# Patient Record
Sex: FEMALE | Race: WHITE | NOT HISPANIC OR LATINO | Employment: OTHER | URBAN - METROPOLITAN AREA
[De-identification: names, ages, dates, MRNs, and addresses within clinical notes are randomized per-mention and may not be internally consistent; named-entity substitution may affect disease eponyms.]

---

## 2021-03-24 ENCOUNTER — BULK ORDERING (OUTPATIENT)
Dept: CASE MANAGEMENT | Facility: OTHER | Age: 51
End: 2021-03-24

## 2021-03-24 DIAGNOSIS — Z23 IMMUNIZATION DUE: ICD-10-CM

## 2021-04-02 ENCOUNTER — APPOINTMENT (OUTPATIENT)
Dept: WOMENS IMAGING | Facility: HOSPITAL | Age: 51
End: 2021-04-02

## 2021-04-02 ENCOUNTER — PROCEDURE VISIT (OUTPATIENT)
Dept: OBSTETRICS AND GYNECOLOGY | Age: 51
End: 2021-04-02

## 2021-04-02 ENCOUNTER — OFFICE VISIT (OUTPATIENT)
Dept: OBSTETRICS AND GYNECOLOGY | Age: 51
End: 2021-04-02

## 2021-04-02 VITALS
DIASTOLIC BLOOD PRESSURE: 68 MMHG | WEIGHT: 157 LBS | SYSTOLIC BLOOD PRESSURE: 102 MMHG | BODY MASS INDEX: 29.64 KG/M2 | HEIGHT: 61 IN

## 2021-04-02 DIAGNOSIS — Z12.4 SCREENING FOR MALIGNANT NEOPLASM OF THE CERVIX: ICD-10-CM

## 2021-04-02 DIAGNOSIS — Z13.89 SCREENING FOR BLOOD OR PROTEIN IN URINE: ICD-10-CM

## 2021-04-02 DIAGNOSIS — Z01.419 ENCOUNTER FOR GYNECOLOGICAL EXAMINATION WITHOUT ABNORMAL FINDING: Primary | ICD-10-CM

## 2021-04-02 DIAGNOSIS — Z12.31 VISIT FOR SCREENING MAMMOGRAM: Primary | ICD-10-CM

## 2021-04-02 DIAGNOSIS — N95.1 PERIMENOPAUSE: ICD-10-CM

## 2021-04-02 LAB
BILIRUB BLD-MCNC: NEGATIVE MG/DL
GLUCOSE UR STRIP-MCNC: NEGATIVE MG/DL
KETONES UR QL: NEGATIVE
LEUKOCYTE EST, POC: NEGATIVE
NITRITE UR-MCNC: NEGATIVE MG/ML
PH UR: 6.5 [PH] (ref 5–8)
PROT UR STRIP-MCNC: NEGATIVE MG/DL
RBC # UR STRIP: NEGATIVE /UL
SP GR UR: 1.01 (ref 1–1.03)
UROBILINOGEN UR QL: NORMAL

## 2021-04-02 PROCEDURE — 77067 SCR MAMMO BI INCL CAD: CPT | Performed by: RADIOLOGY

## 2021-04-02 PROCEDURE — 99386 PREV VISIT NEW AGE 40-64: CPT | Performed by: OBSTETRICS & GYNECOLOGY

## 2021-04-02 PROCEDURE — 77067 SCR MAMMO BI INCL CAD: CPT | Performed by: OBSTETRICS & GYNECOLOGY

## 2021-04-02 PROCEDURE — 81002 URINALYSIS NONAUTO W/O SCOPE: CPT | Performed by: OBSTETRICS & GYNECOLOGY

## 2021-04-02 RX ORDER — METHYLPHENIDATE HYDROCHLORIDE 10 MG/1
10 TABLET ORAL 3 TIMES DAILY
COMMUNITY
Start: 2021-02-15 | End: 2021-04-02

## 2021-04-02 NOTE — PROGRESS NOTES
"Subjective   Trish Corrales is a 51 y.o. female new pt moved from [place] , went to college here has family cc: annual visit today , last pap smear 2018 neg no hx of abnormal paps , mmg 2019 according to pt  , periods are normal , not using anything for contraception , had D&C for heavy bleeding in 2019, yr ago started seeing hormone specialist for weight gain and hot flashes  , did pallets testosterone , stopped them due side effects . Still having regular periods. Currently living in parent's basement and looking for a home. Had colonoscopy before leaving [place] and reports normal.      History of Present Illness    The following portions of the patient's history were reviewed and updated as appropriate: allergies, current medications, past family history, past medical history, past social history, past surgical history and problem list.    Review of Systems   Constitutional: Negative for chills, fatigue and fever.   Gastrointestinal: Negative for abdominal distention and abdominal pain.   Genitourinary: Negative for dyspareunia, dysuria, menstrual problem, pelvic pain, vaginal bleeding, vaginal discharge and vaginal pain.   All other systems reviewed and are negative.  /68   Ht 154.9 cm (61\")   Wt 71.2 kg (157 lb)   LMP 03/30/2021 (Exact Date)   Breastfeeding No   BMI 29.66 kg/m²       Objective   Physical Exam  Vitals and nursing note reviewed.   Constitutional:       Appearance: Normal appearance. She is well-developed and normal weight.   Neck:      Thyroid: No thyromegaly.   Cardiovascular:      Rate and Rhythm: Normal rate and regular rhythm.   Pulmonary:      Effort: Pulmonary effort is normal.      Breath sounds: Normal breath sounds.   Chest:      Breasts:         Right: No mass, nipple discharge, skin change or tenderness.         Left: No mass, nipple discharge, skin change or tenderness.   Abdominal:      General: Bowel sounds are normal. There is no distension.      Palpations: Abdomen is soft.      " Tenderness: There is no abdominal tenderness.   Genitourinary:     General: Normal vulva.      Exam position: Supine.      Labia:         Right: No rash or lesion.         Left: No rash or lesion.       Vagina: Normal. No vaginal discharge.      Cervix: No friability, lesion or cervical bleeding.      Uterus: Not enlarged and not tender.       Adnexa:         Right: No mass or tenderness.          Left: No mass or tenderness.     Musculoskeletal:         General: Normal range of motion.      Cervical back: Normal range of motion and neck supple.   Skin:     General: Skin is warm and dry.      Findings: No rash.   Neurological:      Mental Status: She is alert and oriented to person, place, and time.   Psychiatric:         Mood and Affect: Mood normal.         Behavior: Behavior normal.           Assessment/Plan   Diagnoses and all orders for this visit:    1. Encounter for gynecological examination without abnormal finding (Primary)    2. Screening for malignant neoplasm of the cervix  -     IgP, Aptima HPV    3. Perimenopause    4. Screening for blood or protein in urine  -     POC Urinalysis Dipstick    MGM here today   Counseling was given to patient for the following topics: instructions for management, impressions, importance of treatment compliance and self-breast exams .   Return in about 1 year (around 4/2/2022) for Annual physical with MGm .

## 2021-04-06 LAB
CYTOLOGIST CVX/VAG CYTO: NORMAL
CYTOLOGY CVX/VAG DOC CYTO: NORMAL
CYTOLOGY CVX/VAG DOC THIN PREP: NORMAL
DX ICD CODE: NORMAL
HIV 1 & 2 AB SER-IMP: NORMAL
HPV I/H RISK 4 DNA CVX QL PROBE+SIG AMP: NEGATIVE
OTHER STN SPEC: NORMAL
STAT OF ADQ CVX/VAG CYTO-IMP: NORMAL

## 2021-04-07 DIAGNOSIS — R92.8 ABNORMALITY OF LEFT BREAST ON SCREENING MAMMOGRAM: Primary | ICD-10-CM

## 2021-04-07 PROCEDURE — 76641 ULTRASOUND BREAST COMPLETE: CPT | Performed by: OBSTETRICS & GYNECOLOGY

## 2021-05-04 ENCOUNTER — APPOINTMENT (OUTPATIENT)
Dept: WOMENS IMAGING | Facility: HOSPITAL | Age: 51
End: 2021-05-04

## 2021-05-04 PROCEDURE — 77065 DX MAMMO INCL CAD UNI: CPT | Performed by: RADIOLOGY

## 2021-05-04 PROCEDURE — 77061 BREAST TOMOSYNTHESIS UNI: CPT | Performed by: RADIOLOGY

## 2021-05-04 PROCEDURE — 76641 ULTRASOUND BREAST COMPLETE: CPT | Performed by: RADIOLOGY

## 2021-05-04 PROCEDURE — G0279 TOMOSYNTHESIS, MAMMO: HCPCS | Performed by: RADIOLOGY

## 2021-07-23 ENCOUNTER — TELEPHONE (OUTPATIENT)
Dept: OBSTETRICS AND GYNECOLOGY | Age: 51
End: 2021-07-23

## 2021-08-26 ENCOUNTER — OFFICE VISIT (OUTPATIENT)
Dept: FAMILY MEDICINE CLINIC | Facility: CLINIC | Age: 51
End: 2021-08-26

## 2021-08-26 VITALS
OXYGEN SATURATION: 96 % | BODY MASS INDEX: 27.75 KG/M2 | HEART RATE: 65 BPM | DIASTOLIC BLOOD PRESSURE: 76 MMHG | HEIGHT: 61 IN | WEIGHT: 147 LBS | SYSTOLIC BLOOD PRESSURE: 116 MMHG | TEMPERATURE: 97.3 F

## 2021-08-26 DIAGNOSIS — Z79.899 HIGH RISK MEDICATION USE: ICD-10-CM

## 2021-08-26 DIAGNOSIS — R53.82 CHRONIC FATIGUE: ICD-10-CM

## 2021-08-26 DIAGNOSIS — Z00.00 HEALTHCARE MAINTENANCE: ICD-10-CM

## 2021-08-26 DIAGNOSIS — F90.0 ATTENTION DEFICIT HYPERACTIVITY DISORDER (ADHD), PREDOMINANTLY INATTENTIVE TYPE: ICD-10-CM

## 2021-08-26 DIAGNOSIS — Z23 IMMUNIZATION DUE: Primary | ICD-10-CM

## 2021-08-26 DIAGNOSIS — E55.9 VITAMIN D DEFICIENCY: ICD-10-CM

## 2021-08-26 DIAGNOSIS — N95.1 PERIMENOPAUSE: ICD-10-CM

## 2021-08-26 DIAGNOSIS — D50.9 IRON DEFICIENCY ANEMIA, UNSPECIFIED IRON DEFICIENCY ANEMIA TYPE: ICD-10-CM

## 2021-08-26 PROCEDURE — 90471 IMMUNIZATION ADMIN: CPT | Performed by: FAMILY MEDICINE

## 2021-08-26 PROCEDURE — 90750 HZV VACC RECOMBINANT IM: CPT | Performed by: FAMILY MEDICINE

## 2021-08-26 PROCEDURE — 99203 OFFICE O/P NEW LOW 30 MIN: CPT | Performed by: FAMILY MEDICINE

## 2021-08-26 RX ORDER — FERROUS SULFATE 325(65) MG
TABLET ORAL DAILY
COMMUNITY

## 2021-08-26 RX ORDER — METHYLPHENIDATE HYDROCHLORIDE 10 MG/1
10 TABLET ORAL DAILY
COMMUNITY
End: 2021-11-11 | Stop reason: SDUPTHER

## 2021-08-26 RX ORDER — MULTIPLE VITAMINS W/ MINERALS TAB 9MG-400MCG
1 TAB ORAL DAILY
COMMUNITY

## 2021-08-26 NOTE — PROGRESS NOTES
Subjective   Trish Corrales is a 51 y.o. female.     Chief Complaint   Patient presents with   • new patient     establish care       History of Present Illness     Patient is a new patient here today.  The most significant past medical history includes the ADD diagnosed in adulthood.  She has been on Ritalin by her previous psychiatrist.  We will need to get all the records regarding her Ritalin for before her next refill in 3 months  The following portions of the patient's history were reviewed and updated as appropriate: allergies, current medications, past family history, past medical history, past social history, past surgical history and problem list.    Past Medical History:   Diagnosis Date   • ADHD (attention deficit hyperactivity disorder)    • Depression        Past Surgical History:   Procedure Laterality Date   • DILATATION AND CURETTAGE      aub    • EYE SURGERY  1975/1978   • TONSILLECTOMY      1987   • WISDOM TOOTH EXTRACTION         Family History   Problem Relation Age of Onset   • Endometrial cancer Mother    • Hyperlipidemia Father    • Heart disease Father    • Breast cancer Neg Hx    • Ovarian cancer Neg Hx    • Uterine cancer Neg Hx    • Colon cancer Neg Hx    • Melanoma Neg Hx        Social History     Socioeconomic History   • Marital status: Single     Spouse name: Not on file   • Number of children: Not on file   • Years of education: Not on file   • Highest education level: Not on file   Tobacco Use   • Smoking status: Never Smoker   • Smokeless tobacco: Never Used   Substance and Sexual Activity   • Alcohol use: Never   • Drug use: Never   • Sexual activity: Not Currently     Partners: Male     Birth control/protection: None       Current Outpatient Medications on File Prior to Visit   Medication Sig Dispense Refill   • BIOTIN PO Take 5,000 mcg by mouth. Take once daily.     • CHELATED IRON PO Take 27 mg by mouth Daily. Take once daily     • Docosahexaenoic Acid (Algal Omega-3 DHA) 200 MG  "capsule Take  by mouth Daily. Take once daily.     • Lactobacillus Rhamnosus, GG, (PROBIOTIC COLIC PO) Take  by mouth. Take 2 in the morning.     • methylphenidate (RITALIN) 10 MG tablet Take 10 mg by mouth Daily. Take 1 tablet once daily.     • multivitamin with minerals (WOMENS ONE DAILY PO) Take 1 tablet by mouth Daily.     • vitamin D3 125 MCG (5000 UT) capsule capsule Take 5,000 Units by mouth Daily.       No current facility-administered medications on file prior to visit.       Review of Systems   Constitutional: Negative.        Recent Results (from the past 4704 hour(s))   POC Urinalysis Dipstick    Collection Time: 04/02/21 10:50 AM    Specimen: Urine   Result Value Ref Range    Glucose, UA Negative Negative, 1000 mg/dL (3+) mg/dL    Bilirubin Negative Negative    Ketones, UA Negative Negative    Specific Gravity  1.010 1.005 - 1.030    Blood, UA Negative Negative    pH, Urine 6.5 5.0 - 8.0    Protein, POC Negative Negative mg/dL    Urobilinogen, UA Normal Normal    Leukocytes Negative Negative    Nitrite, UA Negative Negative   IgP, Aptima HPV    Collection Time: 04/02/21 11:44 AM    Specimen: Cervix; ThinPrep Vial   Result Value Ref Range    Diagnosis Comment     Specimen adequacy: Comment     Clinician Provided ICD-10: Comment     Performed by: Comment     . .     Note: Comment     Method: Comment     HPV Aptima Negative Negative     Objective   Vitals:    08/26/21 1427   BP: 116/76   Pulse: 65   Temp: 97.3 °F (36.3 °C)   SpO2: 96%   Weight: 66.7 kg (147 lb)   Height: 154.9 cm (60.98\")     Body mass index is 27.79 kg/m².  Physical Exam  Vitals and nursing note reviewed.   Constitutional:       General: She is not in acute distress.     Appearance: She is well-developed. She is not diaphoretic.   Cardiovascular:      Rate and Rhythm: Normal rate and regular rhythm.   Pulmonary:      Effort: Pulmonary effort is normal. No respiratory distress.      Breath sounds: Normal breath sounds. No wheezing. "           Diagnoses and all orders for this visit:    1. Immunization due (Primary)  -     Shingrix Vaccine    2. Chronic fatigue  -     CBC & Differential  -     Vitamin B12 & Folate  -     TSH Rfx On Abnormal To Free T4  -     T3, free  -     Ferritin    3. Vitamin D deficiency  -     Vitamin D 25 Hydroxy    4. Healthcare maintenance  -     Comprehensive Metabolic Panel  -     Lipid Panel    5. Iron deficiency anemia, unspecified iron deficiency anemia type  -     Ferritin    6. Perimenopause  -     Estradiol  -     Progesterone  -     Sex Horm Binding Globulin  -     Testosterone (Free & Total), LC / MS  -     DHEA-Sulfate    7. Attention deficit hyperactivity disorder (ADHD), predominantly inattentive type    8. High risk medication use      Patient is here also for blood work and requesting whole panel of hormone blood test for her specialist who does bioidentical hormone replacement therapy.  Patient is to return for blood work appointment fasting

## 2021-08-27 DIAGNOSIS — R05.9 COUGH: Primary | ICD-10-CM

## 2021-08-27 RX ORDER — AZITHROMYCIN 250 MG/1
TABLET, FILM COATED ORAL
Qty: 6 TABLET | Refills: 0 | Status: SHIPPED | OUTPATIENT
Start: 2021-08-27 | End: 2021-11-17

## 2021-09-01 LAB
25(OH)D3+25(OH)D2 SERPL-MCNC: 100 NG/ML (ref 30–100)
ALBUMIN SERPL-MCNC: 4.5 G/DL (ref 3.5–5.2)
ALBUMIN/GLOB SERPL: 2.1 G/DL
ALP SERPL-CCNC: 60 U/L (ref 39–117)
ALT SERPL-CCNC: 16 U/L (ref 1–33)
AST SERPL-CCNC: 15 U/L (ref 1–32)
BASOPHILS # BLD AUTO: 0.03 10*3/MM3 (ref 0–0.2)
BASOPHILS NFR BLD AUTO: 0.4 % (ref 0–1.5)
BILIRUB SERPL-MCNC: 0.4 MG/DL (ref 0–1.2)
BUN SERPL-MCNC: 19 MG/DL (ref 6–20)
BUN/CREAT SERPL: 20.7 (ref 7–25)
CALCIUM SERPL-MCNC: 9.7 MG/DL (ref 8.6–10.5)
CHLORIDE SERPL-SCNC: 102 MMOL/L (ref 98–107)
CHOLEST SERPL-MCNC: 207 MG/DL (ref 0–200)
CO2 SERPL-SCNC: 23.5 MMOL/L (ref 22–29)
CREAT SERPL-MCNC: 0.92 MG/DL (ref 0.57–1)
DHEA-S SERPL-MCNC: 96.7 UG/DL (ref 41.2–243.7)
EOSINOPHIL # BLD AUTO: 0.06 10*3/MM3 (ref 0–0.4)
EOSINOPHIL NFR BLD AUTO: 0.9 % (ref 0.3–6.2)
ERYTHROCYTE [DISTWIDTH] IN BLOOD BY AUTOMATED COUNT: 13.1 % (ref 12.3–15.4)
ESTRADIOL SERPL-MCNC: 34.3 PG/ML
FERRITIN SERPL-MCNC: 82 NG/ML (ref 13–150)
FOLATE SERPL-MCNC: >20 NG/ML (ref 4.78–24.2)
GLOBULIN SER CALC-MCNC: 2.1 GM/DL
GLUCOSE SERPL-MCNC: 76 MG/DL (ref 65–99)
HCT VFR BLD AUTO: 39.3 % (ref 34–46.6)
HDLC SERPL-MCNC: 71 MG/DL (ref 40–60)
HGB BLD-MCNC: 13.2 G/DL (ref 12–15.9)
IMM GRANULOCYTES # BLD AUTO: 0.02 10*3/MM3 (ref 0–0.05)
IMM GRANULOCYTES NFR BLD AUTO: 0.3 % (ref 0–0.5)
LDLC SERPL CALC-MCNC: 121 MG/DL (ref 0–100)
LYMPHOCYTES # BLD AUTO: 0.95 10*3/MM3 (ref 0.7–3.1)
LYMPHOCYTES NFR BLD AUTO: 14.2 % (ref 19.6–45.3)
MCH RBC QN AUTO: 29.3 PG (ref 26.6–33)
MCHC RBC AUTO-ENTMCNC: 33.6 G/DL (ref 31.5–35.7)
MCV RBC AUTO: 87.1 FL (ref 79–97)
MONOCYTES # BLD AUTO: 0.41 10*3/MM3 (ref 0.1–0.9)
MONOCYTES NFR BLD AUTO: 6.1 % (ref 5–12)
NEUTROPHILS # BLD AUTO: 5.24 10*3/MM3 (ref 1.7–7)
NEUTROPHILS NFR BLD AUTO: 78.1 % (ref 42.7–76)
NRBC BLD AUTO-RTO: 0 /100 WBC (ref 0–0.2)
PLATELET # BLD AUTO: 206 10*3/MM3 (ref 140–450)
POTASSIUM SERPL-SCNC: 4.3 MMOL/L (ref 3.5–5.2)
PROGEST SERPL-MCNC: <0.1 NG/ML
PROT SERPL-MCNC: 6.6 G/DL (ref 6–8.5)
RBC # BLD AUTO: 4.51 10*6/MM3 (ref 3.77–5.28)
SHBG SERPL-SCNC: 117 NMOL/L (ref 17.3–125)
SODIUM SERPL-SCNC: 143 MMOL/L (ref 136–145)
T3FREE SERPL-MCNC: 2.4 PG/ML (ref 2–4.4)
TESTOST FREE SERPL-MCNC: 1 PG/ML (ref 0–4.2)
TESTOST SERPL-MCNC: 19.1 NG/DL
TRIGL SERPL-MCNC: 84 MG/DL (ref 0–150)
TSH SERPL DL<=0.005 MIU/L-ACNC: 2.44 UIU/ML (ref 0.27–4.2)
VIT B12 SERPL-MCNC: 853 PG/ML (ref 211–946)
VLDLC SERPL CALC-MCNC: 15 MG/DL (ref 5–40)
WBC # BLD AUTO: 6.71 10*3/MM3 (ref 3.4–10.8)

## 2021-09-15 ENCOUNTER — E-VISIT (OUTPATIENT)
Dept: FAMILY MEDICINE CLINIC | Facility: TELEHEALTH | Age: 51
End: 2021-09-15

## 2021-09-15 DIAGNOSIS — B37.31 VAGINAL YEAST INFECTION: Primary | ICD-10-CM

## 2021-09-15 PROCEDURE — 99421 OL DIG E/M SVC 5-10 MIN: CPT | Performed by: NURSE PRACTITIONER

## 2021-09-15 RX ORDER — FLUCONAZOLE 150 MG/1
150 TABLET ORAL ONCE
Qty: 1 TABLET | Refills: 0 | Status: SHIPPED | OUTPATIENT
Start: 2021-09-15 | End: 2021-09-15

## 2021-09-15 NOTE — PATIENT INSTRUCTIONS
Keep area clean and dry   If symptoms do not improve in 3-5 days follow up with your primary care provider or urgent care        Vaginal Yeast Infection, Adult    Vaginal yeast infection is a condition that causes vaginal discharge as well as soreness, swelling, and redness (inflammation) of the vagina. This is a common condition. Some women get this infection frequently.  What are the causes?  This condition is caused by a change in the normal balance of the yeast (candida) and bacteria that live in the vagina. This change causes an overgrowth of yeast, which causes the inflammation.  What increases the risk?  The condition is more likely to develop in women who:  · Take antibiotic medicines.  · Have diabetes.  · Take birth control pills.  · Are pregnant.  · Douche often.  · Have a weak body defense system (immune system).  · Have been taking steroid medicines for a long time.  · Frequently wear tight clothing.  What are the signs or symptoms?  Symptoms of this condition include:  · White, thick, creamy vaginal discharge.  · Swelling, itching, redness, and irritation of the vagina. The lips of the vagina (vulva) may be affected as well.  · Pain or a burning feeling while urinating.  · Pain during sex.  How is this diagnosed?  This condition is diagnosed based on:  · Your medical history.  · A physical exam.  · A pelvic exam. Your health care provider will examine a sample of your vaginal discharge under a microscope. Your health care provider may send this sample for testing to confirm the diagnosis.  How is this treated?  This condition is treated with medicine. Medicines may be over-the-counter or prescription. You may be told to use one or more of the following:  · Medicine that is taken by mouth (orally).  · Medicine that is applied as a cream (topically).  · Medicine that is inserted directly into the vagina (suppository).  Follow these instructions at home:    Lifestyle  · Do not have sex until your health  care provider approves. Tell your sex partner that you have a yeast infection. That person should go to his or her health care provider and ask if they should also be treated.  · Do not wear tight clothes, such as pantyhose or tight pants.  · Wear breathable cotton underwear.  General instructions  · Take or apply over-the-counter and prescription medicines only as told by your health care provider.  · Eat more yogurt. This may help to keep your yeast infection from returning.  · Do not use tampons until your health care provider approves.  · Try taking a sitz bath to help with discomfort. This is a warm water bath that is taken while you are sitting down. The water should only come up to your hips and should cover your buttocks. Do this 3-4 times per day or as told by your health care provider.  · Do not douche.  · If you have diabetes, keep your blood sugar levels under control.  · Keep all follow-up visits as told by your health care provider. This is important.  Contact a health care provider if:  · You have a fever.  · Your symptoms go away and then return.  · Your symptoms do not get better with treatment.  · Your symptoms get worse.  · You have new symptoms.  · You develop blisters in or around your vagina.  · You have blood coming from your vagina and it is not your menstrual period.  · You develop pain in your abdomen.  Summary  · Vaginal yeast infection is a condition that causes discharge as well as soreness, swelling, and redness (inflammation) of the vagina.  · This condition is treated with medicine. Medicines may be over-the-counter or prescription.  · Take or apply over-the-counter and prescription medicines only as told by your health care provider.  · Do not douche. Do not have sex or use tampons until your health care provider approves.  · Contact a health care provider if your symptoms do not get better with treatment or your symptoms go away and then return.  This information is not intended to  replace advice given to you by your health care provider. Make sure you discuss any questions you have with your health care provider.  Document Revised: 07/17/2020 Document Reviewed: 05/06/2019  Elsevier Patient Education © 2021 [employer]

## 2021-09-15 NOTE — PROGRESS NOTES
Trish Corrales    1970  [id number]    I have reviewed the e-Visit questionnaire and patient's answers, my assessment and plan are as follows:      SALLY Corrales is a 51 y.o. with complaint of vaginal itching after taking azithromycin. No other symptoms.     Review of Systems - Genito-Urinary ROS: positive for - vulvar/vaginal symptoms (vaginal itching)   negative for - dysuria, genital discharge, genital ulcers, hematuria, pelvic pain, urinary frequency/urgency or fever      Diagnoses and all orders for this visit:    1. Vaginal yeast infection (Primary)    Other orders  -     fluconazole (Diflucan) 150 MG tablet; Take 1 tablet by mouth 1 (One) Time for 1 dose.  Dispense: 1 tablet; Refill: 0        Any medications prescribed have been sent electronically to   [id number] - [place], [place] - [address] AT [facility] - [phone]  - [phone]   [address]  [facility] [address]  Phone: [phone] Fax: [phone]      Time Documentation  Counseled patient  Counseling topics: diagnosis, treatment options, follow up plan and return instructions        ARIANNE Wisdom  09/15/21  09:45 EDT

## 2021-09-17 ENCOUNTER — OFFICE VISIT (OUTPATIENT)
Dept: OBSTETRICS AND GYNECOLOGY | Age: 51
End: 2021-09-17

## 2021-09-17 VITALS
HEIGHT: 61 IN | WEIGHT: 141 LBS | DIASTOLIC BLOOD PRESSURE: 70 MMHG | SYSTOLIC BLOOD PRESSURE: 110 MMHG | BODY MASS INDEX: 26.62 KG/M2

## 2021-09-17 DIAGNOSIS — Z77.21 EXPOSURE TO BLOOD OR BODY FLUID: ICD-10-CM

## 2021-09-17 DIAGNOSIS — N76.0 ACUTE VAGINITIS: Primary | ICD-10-CM

## 2021-09-17 PROCEDURE — 99213 OFFICE O/P EST LOW 20 MIN: CPT | Performed by: NURSE PRACTITIONER

## 2021-09-17 RX ORDER — FLUCONAZOLE 150 MG/1
150 TABLET ORAL ONCE
Qty: 1 TABLET | Refills: 0 | Status: SHIPPED | OUTPATIENT
Start: 2021-09-17 | End: 2021-09-17

## 2021-09-17 RX ORDER — MAGNESIUM 200 MG
400 TABLET ORAL
COMMUNITY
End: 2022-07-08

## 2021-09-17 NOTE — PROGRESS NOTES
"Subjective   Trish Corrales is a 51 y.o. female is being seen today for   Chief Complaint   Patient presents with   • Vaginitis     took diflucan 3 days ago and monistat last night, wants to make sure that is what it is.   .    History of Present Illness     Patient called with s/s of a vaginal yeast infection and requested to be seen  She did a virtual visit with her PCP a few days ago and was given diflucan which she took on Tuesday  She is still experiencing some redness and swelling and over all discomfort  She did recently have a new partner so she also wants to check STDs  No other concerns    The following portions of the patient's history were reviewed and updated as appropriate: allergies, current medications, past family history, past medical history, past social history, past surgical history and problem list.    /70   Ht 154.9 cm (61\")   Wt 64 kg (141 lb)   LMP 08/06/2021 (Exact Date)   Breastfeeding No   BMI 26.64 kg/m²       Review of Systems   Constitutional: Negative.    HENT: Negative.    Eyes: Negative.    Respiratory: Negative.    Cardiovascular: Negative.    Gastrointestinal: Negative.    Endocrine: Negative.    Genitourinary: Positive for vaginal discharge and vaginal pain.   Musculoskeletal: Negative.    Skin: Negative.    Allergic/Immunologic: Negative.    Neurological: Negative.    Hematological: Negative.    Psychiatric/Behavioral: Negative.        Objective   Physical Exam  Constitutional:       Appearance: She is well-developed.   Genitourinary:     Labia:         Right: No lesion.         Left: No lesion.       Vagina: No signs of injury and foreign body. Erythema present. No bleeding.      Cervix: No cervical motion tenderness, discharge or friability.      Uterus: Not tender.    Neurological:      Mental Status: She is alert and oriented to person, place, and time.   Psychiatric:         Behavior: Behavior normal.           Assessment/Plan   Diagnoses and all orders for this " visit:    1. Acute vaginitis (Primary)  -     NuSwab VG+ - Swab, Vagina    2. Exposure to blood or body fluid    Other orders  -     fluconazole (Diflucan) 150 MG tablet; Take 1 tablet by mouth 1 (One) Time for 1 dose.  Dispense: 1 tablet; Refill: 0      Exam is consistent with vaginal yeast infection.  Patient requests an additional diflucan be sent to pharmacy.  Culture sent and will call with results.  Recommend condoms to prevent STDs with new partners.           Total time spent today with Trish  was 20-29 minutes (level 3).  Greater than 50% of the time was spent coordinating care, answering her questions and counseling regarding pathophysiology of her presenting problem along with plans for any diagnositc work-up and treatment.

## 2021-09-21 LAB
A VAGINAE DNA VAG QL NAA+PROBE: ABNORMAL SCORE
BVAB2 DNA VAG QL NAA+PROBE: ABNORMAL SCORE
C ALBICANS DNA VAG QL NAA+PROBE: POSITIVE
C GLABRATA DNA VAG QL NAA+PROBE: NEGATIVE
C TRACH DNA VAG QL NAA+PROBE: NEGATIVE
MEGA1 DNA VAG QL NAA+PROBE: ABNORMAL SCORE
N GONORRHOEA DNA VAG QL NAA+PROBE: NEGATIVE
T VAGINALIS DNA VAG QL NAA+PROBE: NEGATIVE

## 2021-09-24 ENCOUNTER — OFFICE VISIT (OUTPATIENT)
Dept: OBSTETRICS AND GYNECOLOGY | Age: 51
End: 2021-09-24

## 2021-09-24 VITALS
SYSTOLIC BLOOD PRESSURE: 130 MMHG | BODY MASS INDEX: 26.24 KG/M2 | HEIGHT: 61 IN | WEIGHT: 139 LBS | DIASTOLIC BLOOD PRESSURE: 82 MMHG

## 2021-09-24 DIAGNOSIS — Z77.21 EXPOSURE TO BLOOD OR BODY FLUID: Primary | ICD-10-CM

## 2021-09-24 PROCEDURE — 99214 OFFICE O/P EST MOD 30 MIN: CPT | Performed by: NURSE PRACTITIONER

## 2021-09-24 RX ORDER — IMIQUIMOD 12.5 MG/.25G
1 CREAM TOPICAL 3 TIMES WEEKLY
Qty: 24 EACH | Refills: 1 | Status: SHIPPED | OUTPATIENT
Start: 2021-09-24 | End: 2022-04-20

## 2021-09-24 NOTE — PROGRESS NOTES
"Subjective   Trish Corrales is a 51 y.o. female is being seen today for   Chief Complaint   Patient presents with   • Gynecologic Exam     c/o recently had a really bad yeast infection and had unprotected intercourse with a new partner.  Wants testing and to be checked for hpv.   .    History of Present Illness     Patient here with concerns for HPV after noticing a few small growths on her vagina  She has recently had a new partner and has never noticed these spots before  She did recently have std testing that was negative  She has no other concerns today      The following portions of the patient's history were reviewed and updated as appropriate: allergies, current medications, past family history, past medical history, past social history, past surgical history and problem list.    /82   Ht 154.9 cm (61\")   Wt 63 kg (139 lb)   LMP 09/23/2021   Breastfeeding No   BMI 26.26 kg/m²       Review of Systems   Constitutional: Negative.    HENT: Negative.    Eyes: Negative.    Respiratory: Negative.    Cardiovascular: Negative.    Gastrointestinal: Negative.    Endocrine: Negative.    Genitourinary: Negative.         Vaginal lesion   Musculoskeletal: Negative.    Skin: Negative.    Allergic/Immunologic: Negative.    Neurological: Negative.    Hematological: Negative.    Psychiatric/Behavioral: Negative.        Objective   Physical Exam  Constitutional:       Appearance: She is well-developed.   Genitourinary:     Labia:         Left: Lesion present.       Vagina: Normal.      Cervix: No cervical motion tenderness, discharge or friability.      Uterus: Not tender.           Comments: Small cluster of 3 growths on left lower labia c/w appearance of condyloma  No other lesions or abnormalities noted   Skin:     General: Skin is warm and dry.   Neurological:      Mental Status: She is alert and oriented to person, place, and time.           Assessment/Plan   Diagnoses and all orders for this visit:    1. Exposure " to blood or body fluid (Primary)    Other orders  -     imiquimod (Aldara) 5 % cream; Apply 1 application topically to the appropriate area as directed 3 (Three) Times a Week.  Dispense: 24 each; Refill: 1      Areas of concern are consistent with the appearance of condyloma.  Discussed option for biopsy if she prefers or treatment.  Aldara and TCA offered.  She prefers not biopsy it at this point but would like treatment.  She declines TCA because she is going hiking this weekend but would like to try Aldara.  She will let us know in a few weeks if she doesn't notice any improvement with aldara and can return for TCA treatment.    She would like pap and hpv testing at her next annual as well         Total time spent today with Trish  was 30-39 minutes (level 4).  Greater than 50% of the time was spent coordinating care, answering her questions and counseling regarding pathophysiology of her presenting problem along with plans for any diagnositc work-up and treatment.

## 2021-11-11 DIAGNOSIS — F90.0 ATTENTION DEFICIT HYPERACTIVITY DISORDER (ADHD), PREDOMINANTLY INATTENTIVE TYPE: Primary | ICD-10-CM

## 2021-11-11 RX ORDER — METHYLPHENIDATE HYDROCHLORIDE 10 MG/1
10 TABLET ORAL DAILY
Qty: 90 TABLET | Refills: 0 | Status: SHIPPED | OUTPATIENT
Start: 2021-11-11 | End: 2021-11-18 | Stop reason: SDUPTHER

## 2021-11-18 DIAGNOSIS — F90.0 ATTENTION DEFICIT HYPERACTIVITY DISORDER (ADHD), PREDOMINANTLY INATTENTIVE TYPE: ICD-10-CM

## 2021-11-18 RX ORDER — METHYLPHENIDATE HYDROCHLORIDE 10 MG/1
10 TABLET ORAL 3 TIMES DAILY
Qty: 90 TABLET | Refills: 0 | Status: SHIPPED | OUTPATIENT
Start: 2021-11-18 | End: 2022-01-20 | Stop reason: SDUPTHER

## 2022-01-20 ENCOUNTER — TELEMEDICINE (OUTPATIENT)
Dept: FAMILY MEDICINE CLINIC | Facility: CLINIC | Age: 52
End: 2022-01-20

## 2022-01-20 DIAGNOSIS — F90.0 ATTENTION DEFICIT HYPERACTIVITY DISORDER (ADHD), PREDOMINANTLY INATTENTIVE TYPE: ICD-10-CM

## 2022-01-20 DIAGNOSIS — Z79.899 HIGH RISK MEDICATION USE: Primary | ICD-10-CM

## 2022-01-20 PROCEDURE — 99213 OFFICE O/P EST LOW 20 MIN: CPT | Performed by: FAMILY MEDICINE

## 2022-01-20 RX ORDER — METHYLPHENIDATE HYDROCHLORIDE 10 MG/1
10 TABLET ORAL 3 TIMES DAILY
Qty: 90 TABLET | Refills: 0 | Status: SHIPPED | OUTPATIENT
Start: 2022-01-20 | End: 2022-03-07 | Stop reason: SDUPTHER

## 2022-01-20 NOTE — PROGRESS NOTES
Subjective   Trish Corrales is a 52 y.o. female.   Consent given    Time 20 min    Visit via [facility]    CC: ADD follow up, stable on meds    History of Present Illness   ADD stable on meds  Continue current meds.    The following portions of the patient's history were reviewed and updated as appropriate: allergies, current medications, past family history, past medical history, past social history, past surgical history and problem list.    Past Medical History:   Diagnosis Date   • ADHD (attention deficit hyperactivity disorder)    • Depression        Past Surgical History:   Procedure Laterality Date   • DILATATION AND CURETTAGE      aub    • EYE SURGERY  1975/1978   • TONSILLECTOMY      1987   • WISDOM TOOTH EXTRACTION         Family History   Problem Relation Age of Onset   • Endometrial cancer Mother    • Hyperlipidemia Father    • Heart disease Father    • Breast cancer Neg Hx    • Ovarian cancer Neg Hx    • Uterine cancer Neg Hx    • Colon cancer Neg Hx    • Melanoma Neg Hx        Social History     Socioeconomic History   • Marital status: Single   Tobacco Use   • Smoking status: Never Smoker   • Smokeless tobacco: Never Used   Substance and Sexual Activity   • Alcohol use: Never   • Drug use: Never   • Sexual activity: Not Currently     Partners: Male     Birth control/protection: None       Current Outpatient Medications on File Prior to Visit   Medication Sig Dispense Refill   • BIOTIN PO Take 5,000 mcg by mouth. Take once daily.     • CHELATED IRON PO Take 27 mg by mouth Daily. Take once daily     • Docosahexaenoic Acid (Algal Omega-3 DHA) 200 MG capsule Take  by mouth Daily. Take once daily.     • imiquimod (Aldara) 5 % cream Apply 1 application topically to the appropriate area as directed 3 (Three) Times a Week. 24 each 1   • Lactobacillus Rhamnosus, GG, (PROBIOTIC COLIC PO) Take  by mouth. Take 2 in the morning.     • Magnesium 200 MG tablet Take 400 mg by mouth.     • miconazole (MICOTIN) 2 % vaginal  cream Insert 1 applicator into the vagina Every Night. 45 g 0   • multivitamin with minerals (WOMENS ONE DAILY PO) Take 1 tablet by mouth Daily.     • vitamin D3 125 MCG (5000 UT) capsule capsule Take 5,000 Units by mouth Daily.     • [DISCONTINUED] methylphenidate (RITALIN) 10 MG tablet Take 1 tablet by mouth 3 (Three) Times a Day. Take 1 tablet once daily. 90 tablet 0     No current facility-administered medications on file prior to visit.       Review of Systems   Constitutional: Negative.        Recent Results (from the past 4704 hour(s))   Comprehensive Metabolic Panel    Collection Time: 08/27/21  9:17 AM    Specimen: Blood   Result Value Ref Range    Glucose 76 65 - 99 mg/dL    BUN 19 6 - 20 mg/dL    Creatinine 0.92 0.57 - 1.00 mg/dL    eGFR Non African Am 64 >60 mL/min/1.73    eGFR African Am 78 >60 mL/min/1.73    BUN/Creatinine Ratio 20.7 7.0 - 25.0    Sodium 143 136 - 145 mmol/L    Potassium 4.3 3.5 - 5.2 mmol/L    Chloride 102 98 - 107 mmol/L    Total CO2 23.5 22.0 - 29.0 mmol/L    Calcium 9.7 8.6 - 10.5 mg/dL    Total Protein 6.6 6.0 - 8.5 g/dL    Albumin 4.50 3.50 - 5.20 g/dL    Globulin 2.1 gm/dL    A/G Ratio 2.1 g/dL    Total Bilirubin 0.4 0.0 - 1.2 mg/dL    Alkaline Phosphatase 60 39 - 117 U/L    AST (SGOT) 15 1 - 32 U/L    ALT (SGPT) 16 1 - 33 U/L   Lipid Panel    Collection Time: 08/27/21  9:17 AM    Specimen: Blood   Result Value Ref Range    Total Cholesterol 207 (H) 0 - 200 mg/dL    Triglycerides 84 0 - 150 mg/dL    HDL Cholesterol 71 (H) 40 - 60 mg/dL    VLDL Cholesterol Malvin 15 5 - 40 mg/dL    LDL Chol Calc (NIH) 121 (H) 0 - 100 mg/dL   CBC & Differential    Collection Time: 08/27/21  9:17 AM    Specimen: Blood   Result Value Ref Range    WBC 6.71 3.40 - 10.80 10*3/mm3    RBC 4.51 3.77 - 5.28 10*6/mm3    Hemoglobin 13.2 12.0 - 15.9 g/dL    Hematocrit 39.3 34.0 - 46.6 %    MCV 87.1 79.0 - 97.0 fL    MCH 29.3 26.6 - 33.0 pg    MCHC 33.6 31.5 - 35.7 g/dL    RDW 13.1 12.3 - 15.4 %    Platelets 206  140 - 450 10*3/mm3    Neutrophil Rel % 78.1 (H) 42.7 - 76.0 %    Lymphocyte Rel % 14.2 (L) 19.6 - 45.3 %    Monocyte Rel % 6.1 5.0 - 12.0 %    Eosinophil Rel % 0.9 0.3 - 6.2 %    Basophil Rel % 0.4 0.0 - 1.5 %    Neutrophils Absolute 5.24 1.70 - 7.00 10*3/mm3    Lymphocytes Absolute 0.95 0.70 - 3.10 10*3/mm3    Monocytes Absolute 0.41 0.10 - 0.90 10*3/mm3    Eosinophils Absolute 0.06 0.00 - 0.40 10*3/mm3    Basophils Absolute 0.03 0.00 - 0.20 10*3/mm3    Immature Granulocyte Rel % 0.3 0.0 - 0.5 %    Immature Grans Absolute 0.02 0.00 - 0.05 10*3/mm3    nRBC 0.0 0.0 - 0.2 /100 WBC   Vitamin B12 & Folate    Collection Time: 08/27/21  9:17 AM    Specimen: Blood   Result Value Ref Range    Vitamin B-12 853 211 - 946 pg/mL    Folate >20.00 4.78 - 24.20 ng/mL   TSH Rfx On Abnormal To Free T4    Collection Time: 08/27/21  9:17 AM    Specimen: Blood   Result Value Ref Range    TSH 2.440 0.270 - 4.200 uIU/mL   Vitamin D 25 Hydroxy    Collection Time: 08/27/21  9:17 AM    Specimen: Blood   Result Value Ref Range    25 Hydroxy, Vitamin D 100.0 30.0 - 100.0 ng/ml   Estradiol    Collection Time: 08/27/21  9:17 AM    Specimen: Blood   Result Value Ref Range    Estradiol 34.3 pg/mL   Progesterone    Collection Time: 08/27/21  9:17 AM    Specimen: Blood   Result Value Ref Range    Progesterone <0.1 ng/mL   Sex Horm Binding Globulin    Collection Time: 08/27/21  9:17 AM    Specimen: Blood   Result Value Ref Range    Sex Hormone Binding Globulin 117.0 17.3 - 125.0 nmol/L   Testosterone (Free & Total), LC / MS    Collection Time: 08/27/21  9:17 AM    Specimen: Blood   Result Value Ref Range    Testosterone, Total 19.1 ng/dL    Testosterone, Free 1.0 0.0 - 4.2 pg/mL   DHEA-Sulfate    Collection Time: 08/27/21  9:17 AM    Specimen: Blood   Result Value Ref Range    DHEA-Sulfate 96.7 41.2 - 243.7 ug/dL   T3, free    Collection Time: 08/27/21  9:17 AM    Specimen: Blood   Result Value Ref Range    T3, Free 2.4 2.0 - 4.4 pg/mL   Ferritin     Collection Time: 08/27/21  9:17 AM    Specimen: Blood   Result Value Ref Range    Ferritin 82.00 13.00 - 150.00 ng/mL   NuSwab VG+ - Swab, Vagina    Collection Time: 09/17/21  2:07 PM    Specimen: Vagina; Swab    VA   Result Value Ref Range    Atopobium Vaginae Moderate - 1 Score    BVAB 2 Low - 0 Score    Megasphaera 1 Low - 0 Score    Anna Albicans, RODGER Positive (A) Negative    Anna Glabrata, RODGER Negative Negative    Trichomonas vaginosis Negative Negative    Chlamydia trachomatis, RODGER Negative Negative    Neisseria gonorrhoeae, RODGER Negative Negative     Objective   There were no vitals filed for this visit.  There is no height or weight on file to calculate BMI.  Physical Exam  Constitutional:       Appearance: Normal appearance.   Neurological:      Mental Status: She is alert.           Diagnoses and all orders for this visit:    1. High risk medication use (Primary)    2. Attention deficit hyperactivity disorder (ADHD), predominantly inattentive type  -     methylphenidate (RITALIN) 10 MG tablet; Take 1 tablet by mouth 3 (Three) Times a Day. Take 1 tablet once daily.  Dispense: 90 tablet; Refill: 0      Return in about 3 months (around 4/20/2022) for ADHD.

## 2022-03-07 DIAGNOSIS — F90.0 ATTENTION DEFICIT HYPERACTIVITY DISORDER (ADHD), PREDOMINANTLY INATTENTIVE TYPE: ICD-10-CM

## 2022-03-07 RX ORDER — METHYLPHENIDATE HYDROCHLORIDE 10 MG/1
10 TABLET ORAL 3 TIMES DAILY
Qty: 90 TABLET | Refills: 0 | Status: SHIPPED | OUTPATIENT
Start: 2022-03-07 | End: 2022-04-22 | Stop reason: SDUPTHER

## 2022-03-16 NOTE — PROGRESS NOTES
Addended by: Agustina Mueller on: 3/16/2022 03:11 PM     Modules accepted: Orders Completed screening mammo 4/2/2021

## 2022-04-20 ENCOUNTER — OFFICE VISIT (OUTPATIENT)
Dept: OBSTETRICS AND GYNECOLOGY | Age: 52
End: 2022-04-20

## 2022-04-20 ENCOUNTER — APPOINTMENT (OUTPATIENT)
Dept: WOMENS IMAGING | Facility: HOSPITAL | Age: 52
End: 2022-04-20

## 2022-04-20 ENCOUNTER — PROCEDURE VISIT (OUTPATIENT)
Dept: OBSTETRICS AND GYNECOLOGY | Age: 52
End: 2022-04-20

## 2022-04-20 VITALS
HEIGHT: 61 IN | SYSTOLIC BLOOD PRESSURE: 112 MMHG | DIASTOLIC BLOOD PRESSURE: 70 MMHG | BODY MASS INDEX: 24.35 KG/M2 | WEIGHT: 129 LBS

## 2022-04-20 DIAGNOSIS — Z01.419 ENCOUNTER FOR GYNECOLOGICAL EXAMINATION WITHOUT ABNORMAL FINDING: Primary | ICD-10-CM

## 2022-04-20 DIAGNOSIS — Z12.4 SCREENING FOR MALIGNANT NEOPLASM OF THE CERVIX: ICD-10-CM

## 2022-04-20 DIAGNOSIS — Z12.31 VISIT FOR SCREENING MAMMOGRAM: Primary | ICD-10-CM

## 2022-04-20 PROBLEM — N95.1 PERIMENOPAUSE: Status: RESOLVED | Noted: 2021-04-02 | Resolved: 2022-04-20

## 2022-04-20 PROBLEM — R92.8 ABNORMALITY OF LEFT BREAST ON SCREENING MAMMOGRAPHY: Status: RESOLVED | Noted: 2021-04-07 | Resolved: 2022-04-20

## 2022-04-20 PROBLEM — Z23 IMMUNIZATION DUE: Status: RESOLVED | Noted: 2021-08-26 | Resolved: 2022-04-20

## 2022-04-20 PROCEDURE — 77063 BREAST TOMOSYNTHESIS BI: CPT | Performed by: RADIOLOGY

## 2022-04-20 PROCEDURE — 77063 BREAST TOMOSYNTHESIS BI: CPT | Performed by: OBSTETRICS & GYNECOLOGY

## 2022-04-20 PROCEDURE — 99396 PREV VISIT EST AGE 40-64: CPT | Performed by: OBSTETRICS & GYNECOLOGY

## 2022-04-20 PROCEDURE — 77067 SCR MAMMO BI INCL CAD: CPT | Performed by: OBSTETRICS & GYNECOLOGY

## 2022-04-20 PROCEDURE — 77067 SCR MAMMO BI INCL CAD: CPT | Performed by: RADIOLOGY

## 2022-04-20 NOTE — PROGRESS NOTES
"Subjective   Trish Corrales is a 52 y.o. female presents for annual visit today  last pap 4/2/21 neg , mammo today at the office , colonoscopy 7/2020 due again 2025 , no periods since last yr unknown exact date  denies any PMB or hot flashes , reports no gyno problems.   No periods since August 2021. Still living in parent's basement - really liking it- dating someone she really likes. Considering HRT in future - told her I will sign script.     I last saw her a year ago as new pt moved from [place] , went to college here has family.  Reported D&C for heavy bleeding in 2019, yr ago started seeing hormone specialist for weight gain and hot flashes.  Did pellets of testosterone , stopped them due side effects . Still having regular periods. Currently living in parent's basement and looking for a home. Had colonoscopy before leaving [place] and reports normal.    History of Present Illness    The following portions of the patient's history were reviewed and updated as appropriate: allergies, current medications, past family history, past medical history, past social history, past surgical history and problem list.    Review of Systems   Constitutional: Negative for chills, fatigue and fever.   Gastrointestinal: Negative for abdominal distention and abdominal pain.   Genitourinary: Negative for dyspareunia, dysuria, menstrual problem, pelvic pain, vaginal bleeding, vaginal discharge and vaginal pain.   All other systems reviewed and are negative.  /70   Ht 154.9 cm (61\")   Wt 58.5 kg (129 lb)   LMP  (LMP Unknown)   Breastfeeding No   BMI 24.37 kg/m²       Objective   Physical Exam  Vitals and nursing note reviewed.   Constitutional:       Appearance: Normal appearance. She is well-developed and normal weight.   Neck:      Thyroid: No thyromegaly.   Pulmonary:      Effort: Pulmonary effort is normal.   Chest:   Breasts:      Right: No mass, nipple discharge, skin change or tenderness.      Left: No mass, nipple discharge, " skin change or tenderness.       Abdominal:      General: There is no distension.      Palpations: Abdomen is soft.      Tenderness: There is no abdominal tenderness.   Genitourinary:     General: Normal vulva.      Exam position: Lithotomy position.      Labia:         Right: No rash or lesion.         Left: No rash or lesion.       Vagina: Normal. No vaginal discharge or bleeding.      Cervix: No friability, lesion or cervical bleeding.      Uterus: Not enlarged and not tender.       Adnexa:         Right: No mass or tenderness.          Left: No mass or tenderness.     Musculoskeletal:         General: Normal range of motion.      Cervical back: Normal range of motion.   Skin:     General: Skin is warm and dry.      Findings: No rash.   Neurological:      Mental Status: She is alert and oriented to person, place, and time.   Psychiatric:         Mood and Affect: Mood normal.         Behavior: Behavior normal.           Assessment/Plan   Diagnoses and all orders for this visit:    1. Encounter for gynecological examination without abnormal finding (Primary)  -     IgP, Aptima HPV    2. Screening for malignant neoplasm of the cervix  -     IgP, Aptima HPV        Counseling was given to patient for the following topics: instructions for management, importance of treatment compliance and self-breast exams .  Return in about 1 year (around 4/20/2023) for Annual physical.

## 2022-04-22 ENCOUNTER — TELEMEDICINE (OUTPATIENT)
Dept: FAMILY MEDICINE CLINIC | Facility: CLINIC | Age: 52
End: 2022-04-22

## 2022-04-22 DIAGNOSIS — Z79.899 HIGH RISK MEDICATION USE: Primary | ICD-10-CM

## 2022-04-22 DIAGNOSIS — F90.0 ATTENTION DEFICIT HYPERACTIVITY DISORDER (ADHD), PREDOMINANTLY INATTENTIVE TYPE: ICD-10-CM

## 2022-04-22 PROCEDURE — 99213 OFFICE O/P EST LOW 20 MIN: CPT | Performed by: FAMILY MEDICINE

## 2022-04-22 RX ORDER — METHYLPHENIDATE HYDROCHLORIDE 10 MG/1
10 TABLET ORAL 3 TIMES DAILY
Qty: 90 TABLET | Refills: 0 | Status: SHIPPED | OUTPATIENT
Start: 2022-04-22 | End: 2022-04-26 | Stop reason: SDUPTHER

## 2022-04-22 NOTE — PROGRESS NOTES
Subjective   Trish Corrales is a 52 y.o. female.   Consent given    Time 20 min    Visit via [facility]    Cc: ADD follow up, stable    History of Present Illness     ADD stable on current medications.  Patient is doing well no complications no side effects.  Needs refills today.  Next follow-up in 3 months.    The following portions of the patient's history were reviewed and updated as appropriate: allergies, current medications, past family history, past medical history, past social history, past surgical history and problem list.    Past Medical History:   Diagnosis Date   • ADHD (attention deficit hyperactivity disorder)    • Depression        Past Surgical History:   Procedure Laterality Date   • DILATATION AND CURETTAGE      aub    • EYE SURGERY  1975/1978   • TONSILLECTOMY      1987   • WISDOM TOOTH EXTRACTION         Family History   Problem Relation Age of Onset   • Endometrial cancer Mother    • Hyperlipidemia Father    • Heart disease Father    • Breast cancer Neg Hx    • Ovarian cancer Neg Hx    • Uterine cancer Neg Hx    • Colon cancer Neg Hx    • Melanoma Neg Hx        Social History     Socioeconomic History   • Marital status: Single   Tobacco Use   • Smoking status: Never Smoker   • Smokeless tobacco: Never Used   Substance and Sexual Activity   • Alcohol use: Never   • Drug use: Never   • Sexual activity: Not Currently     Partners: Male     Birth control/protection: None       Current Outpatient Medications on File Prior to Visit   Medication Sig Dispense Refill   • BIOTIN PO Take 5,000 mcg by mouth. Take once daily.     • CHELATED IRON PO Take 27 mg by mouth Daily. Take once daily     • Docosahexaenoic Acid (Algal Omega-3 DHA) 200 MG capsule Take  by mouth Daily. Take once daily.     • Lactobacillus Rhamnosus, GG, (PROBIOTIC COLIC PO) Take  by mouth. Take 2 in the morning.     • Magnesium 200 MG tablet Take 400 mg by mouth.     • miconazole (MICOTIN) 2 % vaginal cream Insert 1 applicator into the  vagina Every Night. 45 g 0   • multivitamin with minerals tablet tablet Take 1 tablet by mouth Daily.     • vitamin D3 125 MCG (5000 UT) capsule capsule Take 5,000 Units by mouth Daily.     • [DISCONTINUED] methylphenidate (RITALIN) 10 MG tablet Take 1 tablet by mouth 3 (Three) Times a Day. Take 1 tablet once daily. 90 tablet 0     No current facility-administered medications on file prior to visit.       Review of Systems    No results found for this or any previous visit (from the past 4704 hour(s)).  Objective   There were no vitals filed for this visit.  There is no height or weight on file to calculate BMI.  Physical Exam  Constitutional:       Appearance: Normal appearance.   Neurological:      Mental Status: She is alert.           Diagnoses and all orders for this visit:    1. High risk medication use (Primary)    2. Attention deficit hyperactivity disorder (ADHD), predominantly inattentive type  -     methylphenidate (RITALIN) 10 MG tablet; Take 1 tablet by mouth 3 (Three) Times a Day. Take 1 tablet once daily.  Dispense: 90 tablet; Refill: 0      Return in about 3 months (around 7/22/2022) for ADHD.

## 2022-04-26 DIAGNOSIS — F90.0 ATTENTION DEFICIT HYPERACTIVITY DISORDER (ADHD), PREDOMINANTLY INATTENTIVE TYPE: ICD-10-CM

## 2022-04-26 RX ORDER — METHYLPHENIDATE HYDROCHLORIDE 10 MG/1
TABLET ORAL
Qty: 90 TABLET | Refills: 0 | Status: SHIPPED | OUTPATIENT
Start: 2022-04-26 | End: 2022-06-13 | Stop reason: SDUPTHER

## 2022-06-13 DIAGNOSIS — F90.0 ATTENTION DEFICIT HYPERACTIVITY DISORDER (ADHD), PREDOMINANTLY INATTENTIVE TYPE: ICD-10-CM

## 2022-06-13 RX ORDER — METHYLPHENIDATE HYDROCHLORIDE 10 MG/1
TABLET ORAL
Qty: 90 TABLET | Refills: 0 | Status: SHIPPED | OUTPATIENT
Start: 2022-06-13 | End: 2022-06-16 | Stop reason: SDUPTHER

## 2022-06-13 NOTE — TELEPHONE ENCOUNTER
Rx Refill Note  Requested Prescriptions     Pending Prescriptions Disp Refills   • methylphenidate (RITALIN) 10 MG tablet 90 tablet 0     Sig: Take 3 tablet once daily.      Last office visit with prescribing clinician: 8/26/2021      Next office visit with prescribing clinician: 7/19/2022            Pinky Tolbert MA  06/13/22, 15:52 EDT     Last Filled 04/26/22

## 2022-06-16 DIAGNOSIS — F90.0 ATTENTION DEFICIT HYPERACTIVITY DISORDER (ADHD), PREDOMINANTLY INATTENTIVE TYPE: ICD-10-CM

## 2022-06-16 RX ORDER — METHYLPHENIDATE HYDROCHLORIDE 10 MG/1
TABLET ORAL
Qty: 90 TABLET | Refills: 0 | Status: SHIPPED | OUTPATIENT
Start: 2022-06-16 | End: 2022-07-19 | Stop reason: SDUPTHER

## 2022-06-16 NOTE — TELEPHONE ENCOUNTER
Caller: Trish Corrales    Relationship: Self    Best call back number: [phone]    Requested Prescriptions:   Requested Prescriptions     Pending Prescriptions Disp Refills   • methylphenidate (RITALIN) 10 MG tablet 90 tablet 0     Sig: Take 3 tablet once daily.        Pharmacy where request should be sent: [facility] [address] [place] [id number] [facility] [phone] [facility] [phone]      Additional details provided by patient: SHE WENT TO [facility] TO  HER PRESCRIPTION AND IT WAS GOING TO BE FOREVER BEFORE SHE CAN GET IT. SHE WOULD LIKE IT CALLED IN TO [facility] INSTEAD AND CANCEL THE ONE AT [facility]    Does the patient have less than a 3 day supply:  [x] Yes  [] No    Hernandez Thomas Rep   06/16/22 10:57 EDT

## 2022-07-08 ENCOUNTER — OFFICE VISIT (OUTPATIENT)
Dept: FAMILY MEDICINE CLINIC | Facility: CLINIC | Age: 52
End: 2022-07-08

## 2022-07-08 VITALS
HEART RATE: 63 BPM | OXYGEN SATURATION: 99 % | DIASTOLIC BLOOD PRESSURE: 81 MMHG | HEIGHT: 61 IN | BODY MASS INDEX: 23.22 KG/M2 | SYSTOLIC BLOOD PRESSURE: 123 MMHG | WEIGHT: 123 LBS | TEMPERATURE: 97.7 F

## 2022-07-08 DIAGNOSIS — Z00.00 HEALTHCARE MAINTENANCE: ICD-10-CM

## 2022-07-08 DIAGNOSIS — Z23 IMMUNIZATION DUE: Primary | ICD-10-CM

## 2022-07-08 DIAGNOSIS — N95.1 PERIMENOPAUSE: ICD-10-CM

## 2022-07-08 PROCEDURE — 90471 IMMUNIZATION ADMIN: CPT | Performed by: FAMILY MEDICINE

## 2022-07-08 PROCEDURE — 99396 PREV VISIT EST AGE 40-64: CPT | Performed by: FAMILY MEDICINE

## 2022-07-08 PROCEDURE — 90750 HZV VACC RECOMBINANT IM: CPT | Performed by: FAMILY MEDICINE

## 2022-07-08 NOTE — PROGRESS NOTES
"Trish Corrales is here today for an annual physical exam.     Eating a healthy diet. Exercising routinely. yes  Regular periods. Wears seat belt. Feels safe at home.   Sexual activity:yes  Birth control:n/a  Pregnancy:0  Sexual and gender orientation:h/s    PHQ-2 Depression Screening  Little interest or pleasure in doing things?  0   Feeling down, depressed, or hopeless?  0   PHQ-2 Total Score  0         I have reviewed the patient's medical, family, and social history in detail and updated the computerized patient record.    Screening history:  Colonoscopy - 2020  Mammogram- 4/2022 , Pap/pelvic - 4/2022  Metabolic - NL  Dental up to date  Vision: up to date  Health Maintenance   Topic Date Due   • ANNUAL PHYSICAL  Never done   • HEPATITIS C SCREENING  Never done   • TDAP/TD VACCINES (2 - Td or Tdap) 10/07/2021   • ZOSTER VACCINE (2 of 2) 10/21/2021   • COVID-19 Vaccine (4 - Booster for Pfizer series) 03/01/2022   • LIPID PANEL  08/27/2022   • INFLUENZA VACCINE  10/01/2022   • MAMMOGRAM  04/20/2024   • PAP SMEAR  04/20/2025   • COLORECTAL CANCER SCREENING  07/27/2030   • Pneumococcal Vaccine 0-64  Aged Out       Review of Systems   Constitutional: Negative.        /81 (BP Location: Right arm, Patient Position: Sitting)   Pulse 63   Temp 97.7 °F (36.5 °C)   Ht 154.9 cm (60.98\")   Wt 55.8 kg (123 lb)   SpO2 99%   BMI 23.25 kg/m²      Physical Exam      Physical Exam  Vitals and nursing note reviewed.   Constitutional:       General: She is not in acute distress.     Appearance: She is well-developed. She is not diaphoretic.   HENT:      Right Ear: Tympanic membrane normal.      Ears:      Comments: B effusiion     Mouth/Throat:      Mouth: Mucous membranes are moist.   Eyes:      Pupils: Pupils are equal, round, and reactive to light.   Cardiovascular:      Rate and Rhythm: Normal rate and regular rhythm.   Pulmonary:      Effort: Pulmonary effort is normal. No respiratory distress.      Breath sounds: Normal " breath sounds. No wheezing.   Musculoskeletal:         General: Normal range of motion.   Skin:     General: Skin is warm.   Neurological:      Mental Status: She is alert.   Psychiatric:         Mood and Affect: Mood normal.       No visits with results within 2 Week(s) from this visit.   Latest known visit with results is:   Office Visit on 04/20/2022   Component Date Value Ref Range Status   • Diagnosis 04/20/2022 Comment   Final    NEGATIVE FOR INTRAEPITHELIAL LESION OR MALIGNANCY.   • Specimen adequacy: 04/20/2022 Comment   Final    Comment: Satisfactory for evaluation.  Endocervical and/or squamous metaplastic  cells (endocervical component) are present.     • Clinician Provided ICD-10: 04/20/2022 Comment   Final    Comment: Z01.419  Z12.4     • Performed by: 04/20/2022 Comment   Final    Fermin Zapien, Cytotechnologist (ASCP)   • . 04/20/2022 .   Final   • Note: 04/20/2022 Comment   Final    Comment: The Pap smear is a screening test designed to aid in the detection of  premalignant and malignant conditions of the uterine cervix.  It is not a  diagnostic procedure and should not be used as the sole means of detecting  cervical cancer.  Both false-positive and false-negative reports do occur.     • Method: 04/20/2022 Comment   Final    Comment: This liquid based ThinPrep(R) pap test was screened with the  use of an image guided system.     • HPV Aptima 04/20/2022 Negative  Negative Final    Comment: This nucleic acid amplification test detects fourteen high-risk  HPV types (16,18,31,33,35,39,45,51,52,56,58,59,66,68) without  differentiation.           Current Outpatient Medications:   •  BIOTIN PO, Take 5,000 mcg by mouth. Take once daily., Disp: , Rfl:   •  CHELATED IRON PO, Take 27 mg by mouth Daily. Take once daily, Disp: , Rfl:   •  Docosahexaenoic Acid (Algal Omega-3 DHA) 200 MG capsule, Take  by mouth Daily. Take once daily., Disp: , Rfl:   •  Lactobacillus Rhamnosus, GG, (PROBIOTIC COLIC PO), Take  by  mouth. Take 2 in the morning., Disp: , Rfl:   •  methylphenidate (RITALIN) 10 MG tablet, Take 3 tablet once daily., Disp: 90 tablet, Rfl: 0  •  vitamin D3 125 MCG (5000 UT) capsule capsule, Take 5,000 Units by mouth Daily., Disp: , Rfl:   •  multivitamin with minerals tablet tablet, Take 1 tablet by mouth Daily., Disp: , Rfl:     Diagnoses and all orders for this visit:    1. Immunization due (Primary)  -     Shingrix Vaccine    2. Healthcare maintenance  -     Comprehensive Metabolic Panel  -     Lipid Panel  -     CBC & Differential    3. Perimenopause  -     Estradiol  -     Testosterone, Free, Total  -     Progesterone  -     TSH Rfx On Abnormal To Free T4  -     DHEA-Sulfate  -     Sex Horm Binding Globulin      Preventive guidance given  Encourage healthy diet and exercise.  Encourage patient to stay up to date on screening examinations as indicated based on age and risk factors. F/U yearly.

## 2022-07-14 LAB
ALBUMIN SERPL-MCNC: 4.3 G/DL (ref 3.8–4.9)
ALBUMIN/GLOB SERPL: 2 {RATIO} (ref 1.2–2.2)
ALP SERPL-CCNC: 60 IU/L (ref 44–121)
ALT SERPL-CCNC: 11 IU/L (ref 0–32)
AST SERPL-CCNC: 13 IU/L (ref 0–40)
BASOPHILS # BLD AUTO: 0 X10E3/UL (ref 0–0.2)
BASOPHILS NFR BLD AUTO: 1 %
BILIRUB SERPL-MCNC: 0.3 MG/DL (ref 0–1.2)
BUN SERPL-MCNC: 14 MG/DL (ref 6–24)
BUN/CREAT SERPL: 17 (ref 9–23)
CALCIUM SERPL-MCNC: 9.5 MG/DL (ref 8.7–10.2)
CHLORIDE SERPL-SCNC: 105 MMOL/L (ref 96–106)
CHOLEST SERPL-MCNC: 205 MG/DL (ref 100–199)
CO2 SERPL-SCNC: 26 MMOL/L (ref 20–29)
CREAT SERPL-MCNC: 0.84 MG/DL (ref 0.57–1)
EGFRCR SERPLBLD CKD-EPI 2021: 84 ML/MIN/1.73
EOSINOPHIL # BLD AUTO: 0.1 X10E3/UL (ref 0–0.4)
EOSINOPHIL NFR BLD AUTO: 3 %
ERYTHROCYTE [DISTWIDTH] IN BLOOD BY AUTOMATED COUNT: 12.9 % (ref 11.7–15.4)
GLOBULIN SER CALC-MCNC: 2.2 G/DL (ref 1.5–4.5)
GLUCOSE SERPL-MCNC: 80 MG/DL (ref 65–99)
HCT VFR BLD AUTO: 39.3 % (ref 34–46.6)
HDLC SERPL-MCNC: 65 MG/DL
HGB BLD-MCNC: 12.7 G/DL (ref 11.1–15.9)
IMM GRANULOCYTES # BLD AUTO: 0 X10E3/UL (ref 0–0.1)
IMM GRANULOCYTES NFR BLD AUTO: 0 %
LDLC SERPL CALC-MCNC: 129 MG/DL (ref 0–99)
LYMPHOCYTES # BLD AUTO: 1.3 X10E3/UL (ref 0.7–3.1)
LYMPHOCYTES NFR BLD AUTO: 37 %
MCH RBC QN AUTO: 28.5 PG (ref 26.6–33)
MCHC RBC AUTO-ENTMCNC: 32.3 G/DL (ref 31.5–35.7)
MCV RBC AUTO: 88 FL (ref 79–97)
MONOCYTES # BLD AUTO: 0.2 X10E3/UL (ref 0.1–0.9)
MONOCYTES NFR BLD AUTO: 7 %
NEUTROPHILS # BLD AUTO: 1.8 X10E3/UL (ref 1.4–7)
NEUTROPHILS NFR BLD AUTO: 52 %
PLATELET # BLD AUTO: 208 X10E3/UL (ref 150–450)
POTASSIUM SERPL-SCNC: 4.4 MMOL/L (ref 3.5–5.2)
PROT SERPL-MCNC: 6.5 G/DL (ref 6–8.5)
RBC # BLD AUTO: 4.46 X10E6/UL (ref 3.77–5.28)
SODIUM SERPL-SCNC: 142 MMOL/L (ref 134–144)
TRIGL SERPL-MCNC: 62 MG/DL (ref 0–149)
VLDLC SERPL CALC-MCNC: 11 MG/DL (ref 5–40)
WBC # BLD AUTO: 3.5 X10E3/UL (ref 3.4–10.8)

## 2022-07-19 ENCOUNTER — TELEMEDICINE (OUTPATIENT)
Dept: FAMILY MEDICINE CLINIC | Facility: CLINIC | Age: 52
End: 2022-07-19

## 2022-07-19 DIAGNOSIS — F90.0 ATTENTION DEFICIT HYPERACTIVITY DISORDER (ADHD), PREDOMINANTLY INATTENTIVE TYPE: Primary | ICD-10-CM

## 2022-07-19 DIAGNOSIS — Z79.899 HIGH RISK MEDICATION USE: ICD-10-CM

## 2022-07-19 PROCEDURE — 99213 OFFICE O/P EST LOW 20 MIN: CPT | Performed by: FAMILY MEDICINE

## 2022-07-19 RX ORDER — METHYLPHENIDATE HYDROCHLORIDE 10 MG/1
TABLET ORAL
Qty: 90 TABLET | Refills: 0 | Status: SHIPPED | OUTPATIENT
Start: 2022-07-19 | End: 2022-09-16 | Stop reason: SDUPTHER

## 2022-07-19 NOTE — PROGRESS NOTES
Subjective   Trish Corrales is a 52 y.o. female.   Consent given    Time 20 min    Visit via [facility]    CC: Follow-up on ADD and medications.      History of Present Illness   ADD stable on current medications.  Needs refills today.      The following portions of the patient's history were reviewed and updated as appropriate: allergies, current medications, past family history, past medical history, past social history, past surgical history and problem list.    Past Medical History:   Diagnosis Date   • ADHD (attention deficit hyperactivity disorder)    • Allergic 1970    Penecillin, Sulfa   • Depression    • Hyperlipidemia 2021 207   • Urinary tract infection 1987, 2000       Past Surgical History:   Procedure Laterality Date   • DILATATION AND CURETTAGE      aub    • EYE SURGERY  1975/1978   • TONSILLECTOMY      1987   • WISDOM TOOTH EXTRACTION         Family History   Problem Relation Age of Onset   • Endometrial cancer Mother    • Hyperlipidemia Father    • Heart disease Father    • Breast cancer Neg Hx    • Ovarian cancer Neg Hx    • Uterine cancer Neg Hx    • Colon cancer Neg Hx    • Melanoma Neg Hx        Social History     Socioeconomic History   • Marital status: Single   Tobacco Use   • Smoking status: Never Smoker   • Smokeless tobacco: Never Used   Substance and Sexual Activity   • Alcohol use: Yes     Alcohol/week: 1.0 - 2.0 standard drink     Types: 1 - 2 Glasses of wine per week   • Drug use: Never   • Sexual activity: Yes     Partners: Male     Birth control/protection: None       Current Outpatient Medications on File Prior to Visit   Medication Sig Dispense Refill   • BIOTIN PO Take 5,000 mcg by mouth. Take once daily.     • CHELATED IRON PO Take 27 mg by mouth Daily. Take once daily     • Docosahexaenoic Acid (Algal Omega-3 DHA) 200 MG capsule Take  by mouth Daily. Take once daily.     • Lactobacillus Rhamnosus, GG, (PROBIOTIC COLIC PO) Take  by mouth. Take 2 in the morning.     • multivitamin  with minerals tablet tablet Take 1 tablet by mouth Daily.     • vitamin D3 125 MCG (5000 UT) capsule capsule Take 5,000 Units by mouth Daily.     • [DISCONTINUED] methylphenidate (RITALIN) 10 MG tablet Take 3 tablet once daily. 90 tablet 0     No current facility-administered medications on file prior to visit.       Review of Systems   Constitutional: Negative.        Recent Results (from the past 4704 hour(s))   IgP, Aptima HPV    Collection Time: 04/20/22  2:15 PM    Specimen: Cervix; ThinPrep Vial   Result Value Ref Range    Diagnosis Comment     Specimen adequacy: Comment     Clinician Provided ICD-10: Comment     Performed by: Comment     . .     Note: Comment     Method: Comment     HPV Aptima Negative Negative   Comprehensive Metabolic Panel    Collection Time: 07/13/22  9:25 AM    Specimen: Blood   Result Value Ref Range    Glucose 80 65 - 99 mg/dL    BUN 14 6 - 24 mg/dL    Creatinine 0.84 0.57 - 1.00 mg/dL    EGFR Result 84 >59 mL/min/1.73    BUN/Creatinine Ratio 17 9 - 23    Sodium 142 134 - 144 mmol/L    Potassium 4.4 3.5 - 5.2 mmol/L    Chloride 105 96 - 106 mmol/L    Total CO2 26 20 - 29 mmol/L    Calcium 9.5 8.7 - 10.2 mg/dL    Total Protein 6.5 6.0 - 8.5 g/dL    Albumin 4.3 3.8 - 4.9 g/dL    Globulin 2.2 1.5 - 4.5 g/dL    A/G Ratio 2.0 1.2 - 2.2    Total Bilirubin 0.3 0.0 - 1.2 mg/dL    Alkaline Phosphatase 60 44 - 121 IU/L    AST (SGOT) 13 0 - 40 IU/L    ALT (SGPT) 11 0 - 32 IU/L   Lipid Panel    Collection Time: 07/13/22  9:25 AM    Specimen: Blood   Result Value Ref Range    Total Cholesterol 205 (H) 100 - 199 mg/dL    Triglycerides 62 0 - 149 mg/dL    HDL Cholesterol 65 >39 mg/dL    VLDL Cholesterol Malvin 11 5 - 40 mg/dL    LDL Chol Calc (NIH) 129 (H) 0 - 99 mg/dL   CBC & Differential    Collection Time: 07/13/22  9:25 AM    Specimen: Blood   Result Value Ref Range    WBC 3.5 3.4 - 10.8 x10E3/uL    RBC 4.46 3.77 - 5.28 x10E6/uL    Hemoglobin 12.7 11.1 - 15.9 g/dL    Hematocrit 39.3 34.0 - 46.6 %     MCV 88 79 - 97 fL    MCH 28.5 26.6 - 33.0 pg    MCHC 32.3 31.5 - 35.7 g/dL    RDW 12.9 11.7 - 15.4 %    Platelets 208 150 - 450 x10E3/uL    Neutrophil Rel % 52 Not Estab. %    Lymphocyte Rel % 37 Not Estab. %    Monocyte Rel % 7 Not Estab. %    Eosinophil Rel % 3 Not Estab. %    Basophil Rel % 1 Not Estab. %    Neutrophils Absolute 1.8 1.4 - 7.0 x10E3/uL    Lymphocytes Absolute 1.3 0.7 - 3.1 x10E3/uL    Monocytes Absolute 0.2 0.1 - 0.9 x10E3/uL    Eosinophils Absolute 0.1 0.0 - 0.4 x10E3/uL    Basophils Absolute 0.0 0.0 - 0.2 x10E3/uL    Immature Granulocyte Rel % 0 Not Estab. %    Immature Grans Absolute 0.0 0.0 - 0.1 x10E3/uL     Objective   There were no vitals filed for this visit.  There is no height or weight on file to calculate BMI.  Physical Exam  Constitutional:       Appearance: Normal appearance. She is normal weight.   Neurological:      Mental Status: She is alert.           Diagnoses and all orders for this visit:    1. Attention deficit hyperactivity disorder (ADHD), predominantly inattentive type (Primary)  -     methylphenidate (RITALIN) 10 MG tablet; Take 3 tablet once daily.  Dispense: 90 tablet; Refill: 0    2. High risk medication use

## 2022-09-16 DIAGNOSIS — F90.0 ATTENTION DEFICIT HYPERACTIVITY DISORDER (ADHD), PREDOMINANTLY INATTENTIVE TYPE: ICD-10-CM

## 2022-09-16 RX ORDER — METHYLPHENIDATE HYDROCHLORIDE 10 MG/1
TABLET ORAL
Qty: 90 TABLET | Refills: 0 | Status: SHIPPED | OUTPATIENT
Start: 2022-09-16 | End: 2022-10-14 | Stop reason: SDUPTHER

## 2022-09-21 ENCOUNTER — OFFICE VISIT (OUTPATIENT)
Dept: FAMILY MEDICINE CLINIC | Facility: CLINIC | Age: 52
End: 2022-09-21

## 2022-09-21 VITALS
TEMPERATURE: 97.7 F | SYSTOLIC BLOOD PRESSURE: 115 MMHG | DIASTOLIC BLOOD PRESSURE: 77 MMHG | HEART RATE: 60 BPM | OXYGEN SATURATION: 100 % | BODY MASS INDEX: 23.86 KG/M2 | WEIGHT: 126.2 LBS

## 2022-09-21 DIAGNOSIS — B88.0 CHIGGER BITES: Primary | ICD-10-CM

## 2022-09-21 PROCEDURE — 99213 OFFICE O/P EST LOW 20 MIN: CPT | Performed by: NURSE PRACTITIONER

## 2022-09-21 RX ORDER — PROGESTERONE 200 MG/1
200 CAPSULE ORAL DAILY
COMMUNITY
Start: 2022-09-13

## 2022-09-21 RX ORDER — PREDNISONE 20 MG/1
TABLET ORAL
Qty: 23 TABLET | Refills: 0 | Status: SHIPPED | OUTPATIENT
Start: 2022-09-21 | End: 2023-01-16

## 2022-09-21 RX ORDER — HYDROXYZINE HYDROCHLORIDE 25 MG/1
25 TABLET, FILM COATED ORAL NIGHTLY PRN
Qty: 30 TABLET | Refills: 0 | Status: SHIPPED | OUTPATIENT
Start: 2022-09-21 | End: 2023-01-16

## 2022-09-21 NOTE — PROGRESS NOTES
"Chief Complaint  chigger bites all over    Subjective        Trish Corrales presents to [facility]  History of Present Illness  Chigger bites: This is a new problem.  Symptoms include pruritus.  Patient has tried Zyrtec, Claritin, Benadryl and anti-itch cream with no symptom improvement.  Objective   Vital Signs:  /77 (BP Location: Right arm, Patient Position: Sitting, Cuff Size: Adult)   Pulse 60   Temp 97.7 °F (36.5 °C) (Temporal)   Wt 57.2 kg (126 lb 3.2 oz)   SpO2 100%   BMI 23.86 kg/m²   Estimated body mass index is 23.86 kg/m² as calculated from the following:    Height as of 7/8/22: 154.9 cm (60.98\").    Weight as of this encounter: 57.2 kg (126 lb 3.2 oz).    BMI is within normal parameters. No other follow-up for BMI required.      Physical Exam  Vitals and nursing note reviewed.   Constitutional:       Appearance: Normal appearance.   Cardiovascular:      Rate and Rhythm: Normal rate and regular rhythm.      Heart sounds: Normal heart sounds.   Pulmonary:      Effort: Pulmonary effort is normal.      Breath sounds: Normal breath sounds.   Skin:     Comments: Multiple bites over chest, abdomen, back, bilateral arms, groin area and bilateral upper legs.   Neurological:      Mental Status: She is alert and oriented to person, place, and time.   Psychiatric:         Mood and Affect: Mood normal.        Result Review :           Assessment and Plan   Diagnoses and all orders for this visit:    1. Chigger bites (Primary)  Comments:  - Advised patient to use OTC calamine lotion.  Orders:  -     predniSONE (DELTASONE) 20 MG tablet; TAKE 3 TABLET X 5 DAYS, THEN TAKE 2 TABLETS X 3 DAYS, THEN TAKE 1 TABLET X 2 DAYS  Dispense: 23 tablet; Refill: 0  -     hydrOXYzine (ATARAX) 25 MG tablet; Take 1 tablet by mouth At Night As Needed for Itching.  Dispense: 30 tablet; Refill: 0           I spent 20 minutes caring for Trish on this date of service. This time includes time spent by me " in the following activities:performing a medically appropriate examination and/or evaluation , counseling and educating the patient/family/caregiver, ordering medications, tests, or procedures and documenting information in the medical record  Follow Up   Return if symptoms worsen or fail to improve.  Patient was given instructions and counseling regarding her condition or for health maintenance advice. Please see specific information pulled into the AVS if appropriate.

## 2022-10-14 ENCOUNTER — OFFICE VISIT (OUTPATIENT)
Dept: FAMILY MEDICINE CLINIC | Facility: CLINIC | Age: 52
End: 2022-10-14

## 2022-10-14 VITALS
TEMPERATURE: 98.2 F | WEIGHT: 128.8 LBS | HEIGHT: 61 IN | DIASTOLIC BLOOD PRESSURE: 76 MMHG | OXYGEN SATURATION: 100 % | HEART RATE: 58 BPM | BODY MASS INDEX: 24.32 KG/M2 | SYSTOLIC BLOOD PRESSURE: 118 MMHG

## 2022-10-14 DIAGNOSIS — F90.0 ATTENTION DEFICIT HYPERACTIVITY DISORDER (ADHD), PREDOMINANTLY INATTENTIVE TYPE: Primary | ICD-10-CM

## 2022-10-14 DIAGNOSIS — Z23 IMMUNIZATION DUE: ICD-10-CM

## 2022-10-14 DIAGNOSIS — Z79.899 HIGH RISK MEDICATION USE: ICD-10-CM

## 2022-10-14 PROCEDURE — 99213 OFFICE O/P EST LOW 20 MIN: CPT | Performed by: FAMILY MEDICINE

## 2022-10-14 PROCEDURE — 0124A COVID-19 (PFIZER) BIVALENT BOOSTER 12+YRS: CPT | Performed by: FAMILY MEDICINE

## 2022-10-14 PROCEDURE — 90686 IIV4 VACC NO PRSV 0.5 ML IM: CPT | Performed by: FAMILY MEDICINE

## 2022-10-14 PROCEDURE — 90471 IMMUNIZATION ADMIN: CPT | Performed by: FAMILY MEDICINE

## 2022-10-14 PROCEDURE — 91312 COVID-19 (PFIZER) BIVALENT BOOSTER 12+YRS: CPT | Performed by: FAMILY MEDICINE

## 2022-10-14 RX ORDER — METHYLPHENIDATE HYDROCHLORIDE 10 MG/1
TABLET ORAL
Qty: 90 TABLET | Refills: 0 | Status: SHIPPED | OUTPATIENT
Start: 2022-10-14 | End: 2023-01-10 | Stop reason: SDUPTHER

## 2022-10-14 NOTE — PROGRESS NOTES
Subjective   Trish Corrales is a 52 y.o. female.     Chief Complaint   Patient presents with   • ADD       History of Present Illness     ADD follow-up.  Stable on current medications.  Needs refills.  Complaint test today.    The following portions of the patient's history were reviewed and updated as appropriate: allergies, current medications, past family history, past medical history, past social history, past surgical history and problem list.    Past Medical History:   Diagnosis Date   • ADHD (attention deficit hyperactivity disorder)    • Allergic 1970    Penecillin, Sulfa   • Depression    • Hyperlipidemia 2021 207   • Urinary tract infection 1987, 2000       Past Surgical History:   Procedure Laterality Date   • DILATATION AND CURETTAGE      aub    • EYE SURGERY  1975/1978   • TONSILLECTOMY      1987   • WISDOM TOOTH EXTRACTION         Family History   Problem Relation Age of Onset   • Endometrial cancer Mother    • Hyperlipidemia Father    • Heart disease Father    • Breast cancer Neg Hx    • Ovarian cancer Neg Hx    • Uterine cancer Neg Hx    • Colon cancer Neg Hx    • Melanoma Neg Hx        Social History     Socioeconomic History   • Marital status: Single   Tobacco Use   • Smoking status: Never   • Smokeless tobacco: Never   Substance and Sexual Activity   • Alcohol use: Not Currently   • Drug use: Never   • Sexual activity: Yes     Partners: Male     Birth control/protection: None       Current Outpatient Medications on File Prior to Visit   Medication Sig Dispense Refill   • BIOTIN PO Take 5,000 mcg by mouth. Take once daily.     • CHELATED IRON PO Take 27 mg by mouth Daily. Take once daily     • Docosahexaenoic Acid (Algal Omega-3 DHA) 200 MG capsule Take  by mouth Daily. Take once daily.     • hydrOXYzine (ATARAX) 25 MG tablet Take 1 tablet by mouth At Night As Needed for Itching. 30 tablet 0   • Lactobacillus Rhamnosus, GG, (PROBIOTIC COLIC PO) Take  by mouth. Take 2 in the morning.     •  multivitamin with minerals tablet tablet Take 1 tablet by mouth Daily.     • predniSONE (DELTASONE) 20 MG tablet TAKE 3 TABLET X 5 DAYS, THEN TAKE 2 TABLETS X 3 DAYS, THEN TAKE 1 TABLET X 2 DAYS 23 tablet 0   • Progesterone (PROMETRIUM) 200 MG capsule Take 200 mg by mouth Daily.     • vitamin D3 125 MCG (5000 UT) capsule capsule Take 5,000 Units by mouth Daily.     • [DISCONTINUED] methylphenidate (RITALIN) 10 MG tablet Take 3 tablet once daily. 90 tablet 0     No current facility-administered medications on file prior to visit.       Review of Systems   Constitutional: Negative.        Recent Results (from the past 4704 hour(s))   IgP, Aptima HPV    Collection Time: 04/20/22  2:15 PM    Specimen: Cervix; ThinPrep Vial   Result Value Ref Range    Diagnosis Comment     Specimen adequacy: Comment     Clinician Provided ICD-10: Comment     Performed by: Comment     . .     Note: Comment     Method: Comment     HPV Aptima Negative Negative   Comprehensive Metabolic Panel    Collection Time: 07/13/22  9:25 AM    Specimen: Blood   Result Value Ref Range    Glucose 80 65 - 99 mg/dL    BUN 14 6 - 24 mg/dL    Creatinine 0.84 0.57 - 1.00 mg/dL    EGFR Result 84 >59 mL/min/1.73    BUN/Creatinine Ratio 17 9 - 23    Sodium 142 134 - 144 mmol/L    Potassium 4.4 3.5 - 5.2 mmol/L    Chloride 105 96 - 106 mmol/L    Total CO2 26 20 - 29 mmol/L    Calcium 9.5 8.7 - 10.2 mg/dL    Total Protein 6.5 6.0 - 8.5 g/dL    Albumin 4.3 3.8 - 4.9 g/dL    Globulin 2.2 1.5 - 4.5 g/dL    A/G Ratio 2.0 1.2 - 2.2    Total Bilirubin 0.3 0.0 - 1.2 mg/dL    Alkaline Phosphatase 60 44 - 121 IU/L    AST (SGOT) 13 0 - 40 IU/L    ALT (SGPT) 11 0 - 32 IU/L   Lipid Panel    Collection Time: 07/13/22  9:25 AM    Specimen: Blood   Result Value Ref Range    Total Cholesterol 205 (H) 100 - 199 mg/dL    Triglycerides 62 0 - 149 mg/dL    HDL Cholesterol 65 >39 mg/dL    VLDL Cholesterol Malvin 11 5 - 40 mg/dL    LDL Chol Calc (NIH) 129 (H) 0 - 99 mg/dL   CBC &  "Differential    Collection Time: 07/13/22  9:25 AM    Specimen: Blood   Result Value Ref Range    WBC 3.5 3.4 - 10.8 x10E3/uL    RBC 4.46 3.77 - 5.28 x10E6/uL    Hemoglobin 12.7 11.1 - 15.9 g/dL    Hematocrit 39.3 34.0 - 46.6 %    MCV 88 79 - 97 fL    MCH 28.5 26.6 - 33.0 pg    MCHC 32.3 31.5 - 35.7 g/dL    RDW 12.9 11.7 - 15.4 %    Platelets 208 150 - 450 x10E3/uL    Neutrophil Rel % 52 Not Estab. %    Lymphocyte Rel % 37 Not Estab. %    Monocyte Rel % 7 Not Estab. %    Eosinophil Rel % 3 Not Estab. %    Basophil Rel % 1 Not Estab. %    Neutrophils Absolute 1.8 1.4 - 7.0 x10E3/uL    Lymphocytes Absolute 1.3 0.7 - 3.1 x10E3/uL    Monocytes Absolute 0.2 0.1 - 0.9 x10E3/uL    Eosinophils Absolute 0.1 0.0 - 0.4 x10E3/uL    Basophils Absolute 0.0 0.0 - 0.2 x10E3/uL    Immature Granulocyte Rel % 0 Not Estab. %    Immature Grans Absolute 0.0 0.0 - 0.1 x10E3/uL     Objective   Vitals:    10/14/22 1015   BP: 118/76   BP Location: Right arm   Patient Position: Sitting   Cuff Size: Adult   Pulse: 58   Temp: 98.2 °F (36.8 °C)   TempSrc: Temporal   SpO2: 100%   Weight: 58.4 kg (128 lb 12.8 oz)   Height: 154.9 cm (60.98\")     Body mass index is 24.35 kg/m².  Physical Exam  Vitals and nursing note reviewed.   Constitutional:       General: She is not in acute distress.     Appearance: She is well-developed. She is not diaphoretic.   Cardiovascular:      Rate and Rhythm: Normal rate and regular rhythm.   Pulmonary:      Effort: Pulmonary effort is normal. No respiratory distress.      Breath sounds: Normal breath sounds. No wheezing.       Patient was extensively counseled regarding coronavirus immunization and necessity of it.  However patient refuses at this time and is against coronavirus vaccination as well as other vaccinations.  Risks and benefits of the vaccination were discussed however patient is adamant and refuses all the vaccinations today.      Diagnoses and all orders for this visit:    1. Attention deficit " hyperactivity disorder (ADHD), predominantly inattentive type (Primary)  -     methylphenidate (RITALIN) 10 MG tablet; Take 3 tablet once daily.  Dispense: 90 tablet; Refill: 0  -     Compliance Drug Analysis, Ur - Urine, Clean Catch    2. Immunization due  -     FluLaval/Fluzone >6 mos ([id number])  -     COVID-19 Bivalent Booster (Pfizer) 12+yrs    3. High risk medication use      Return in about 3 months (around 1/14/2023) for ADHD.          Answers for HPI/ROS submitted by the patient on 10/8/2022  Please describe your symptoms.: I believe I am coming in for a toxicology test  Have you had these symptoms before?: No  How long have you been having these symptoms?: 1-4 days  What is the primary reason for your visit?: Other

## 2022-10-21 LAB — DRUGS UR: NORMAL

## 2023-01-10 DIAGNOSIS — F90.0 ATTENTION DEFICIT HYPERACTIVITY DISORDER (ADHD), PREDOMINANTLY INATTENTIVE TYPE: ICD-10-CM

## 2023-01-10 NOTE — TELEPHONE ENCOUNTER
Caller: Trish Corrales    Relationship: Self    Best call back number: [phone]    Requested Prescriptions:   Requested Prescriptions     Pending Prescriptions Disp Refills   • methylphenidate (RITALIN) 10 MG tablet 90 tablet 0     Sig: Take 3 tablet once daily.        Pharmacy where request should be sent: [facility] [address] [place] [id number] [facility] [phone] [facility] [phone] [facility]       PATIENT IS SCHEDULED TO SEE DR. CANTU ON 01/16, BUT WILL RUN OUT OF THIS MEDICATION BEFORE HAND. PATIENT WAS PREVIOUSLY DR. ARENAS'S PATIENT.    Does the patient have less than a 3 day supply:  [] Yes  [x] No    Would you like a call back once the refill request has been completed: [x] Yes [] No    If the office needs to give you a call back, can they leave a voicemail: [x] Yes [] No    Hernandez Valentino Rep   01/10/23 11:56 EST

## 2023-01-10 NOTE — TELEPHONE ENCOUNTER
Rx Refill Note  Requested Prescriptions     Pending Prescriptions Disp Refills   • methylphenidate (RITALIN) 10 MG tablet 90 tablet 0     Sig: Take 3 tablet once daily.      Last office visit with prescribing clinician: 10/14/2022   Last telemedicine visit with prescribing clinician: Visit date not found   Next office visit with prescribing clinician: Visit date not found

## 2023-01-11 RX ORDER — METHYLPHENIDATE HYDROCHLORIDE 10 MG/1
TABLET ORAL
Qty: 15 TABLET | Refills: 0 | Status: SHIPPED | OUTPATIENT
Start: 2023-01-11 | End: 2023-01-16 | Stop reason: SDUPTHER

## 2023-01-16 ENCOUNTER — PATIENT ROUNDING (BHMG ONLY) (OUTPATIENT)
Dept: FAMILY MEDICINE CLINIC | Facility: CLINIC | Age: 53
End: 2023-01-16

## 2023-01-16 ENCOUNTER — OFFICE VISIT (OUTPATIENT)
Dept: FAMILY MEDICINE CLINIC | Facility: CLINIC | Age: 53
End: 2023-01-16
Payer: COMMERCIAL

## 2023-01-16 VITALS
WEIGHT: 131.6 LBS | BODY MASS INDEX: 24.84 KG/M2 | HEIGHT: 61 IN | TEMPERATURE: 94.1 F | HEART RATE: 61 BPM | DIASTOLIC BLOOD PRESSURE: 70 MMHG | SYSTOLIC BLOOD PRESSURE: 118 MMHG | OXYGEN SATURATION: 99 % | RESPIRATION RATE: 16 BRPM

## 2023-01-16 DIAGNOSIS — Z00.00 ENCOUNTER FOR MEDICAL EXAMINATION TO ESTABLISH CARE: ICD-10-CM

## 2023-01-16 DIAGNOSIS — F90.0 ATTENTION DEFICIT HYPERACTIVITY DISORDER (ADHD), PREDOMINANTLY INATTENTIVE TYPE: Primary | ICD-10-CM

## 2023-01-16 DIAGNOSIS — Z79.899 HIGH RISK MEDICATION USE: ICD-10-CM

## 2023-01-16 PROCEDURE — 99213 OFFICE O/P EST LOW 20 MIN: CPT | Performed by: STUDENT IN AN ORGANIZED HEALTH CARE EDUCATION/TRAINING PROGRAM

## 2023-01-16 NOTE — PROGRESS NOTES
January 16, 2023      Matthew Corrales,     I want to officially welcome you to our practice and ask about your recent visit.     Overall were you satisfied with your visit? YES      Would you recommend our office to friends and family?   YES    Your experience is very important to us.  You may receive an email regarding a survey.  Please take a moment to complete.  This will help us to improve.      I appreciate you taking the time to answer these questions.       Thank you and have a great day.

## 2023-01-18 RX ORDER — METHYLPHENIDATE HYDROCHLORIDE 10 MG/1
TABLET ORAL
Qty: 90 TABLET | Refills: 0 | Status: SHIPPED | OUTPATIENT
Start: 2023-01-18 | End: 2023-03-09 | Stop reason: SDUPTHER

## 2023-01-18 NOTE — PROGRESS NOTES
Chief Complaint  Pt presented to clinic with   Chief Complaint   Patient presents with   • Med Refill     New patient visit          History of Present Illness  Ms. Chambers 53-year-old female who presented to the clinic for the first time for establishing medical care.  Patient is a known case of ADHD and is on Ritalin 10 mg p.o. daily.  Patient has testosterone and estrogen supplement through implant and takes progesterone orally recommended by her OB/GYN.  Other than these patient is taking supplementation.  Stated she had COVID infection recently and all her symptoms have resolved except fatigue  Review of Systems   Constitutional: Positive for fatigue. Negative for activity change, appetite change, fever and unexpected weight change.   HENT: Negative for congestion, rhinorrhea, sore throat and voice change.    Respiratory: Negative for cough, chest tightness, shortness of breath and wheezing.    Cardiovascular: Negative for chest pain and palpitations.   Gastrointestinal: Negative for abdominal distention, abdominal pain, diarrhea, nausea and vomiting.   Genitourinary: Negative for difficulty urinating and dysuria.   Musculoskeletal: Negative for arthralgias and myalgias.   Skin: Negative for rash.   Neurological: Negative for dizziness, tremors and weakness.   Hematological: Negative for adenopathy.   Psychiatric/Behavioral: Negative for sleep disturbance. The patient is not nervous/anxious.        PMH:   Outpatient Medications Prior to Visit   Medication Sig Dispense Refill   • Docosahexaenoic Acid (Algal Omega-3 DHA) 200 MG capsule Take  by mouth Daily. Take once daily.     • Lactobacillus Rhamnosus, GG, (PROBIOTIC COLIC PO) Take  by mouth. Take 2 in the morning.     • multivitamin with minerals tablet tablet Take 1 tablet by mouth Daily.     • Progesterone (PROMETRIUM) 200 MG capsule Take 200 mg by mouth Daily.     • vitamin D3 125 MCG (5000 UT) capsule capsule Take 5,000 Units by mouth Daily.     • BIOTIN PO  "Take 5,000 mcg by mouth. Take once daily.     • CHELATED IRON PO Take 27 mg by mouth Daily. Take once daily     • hydrOXYzine (ATARAX) 25 MG tablet Take 1 tablet by mouth At Night As Needed for Itching. 30 tablet 0   • methylphenidate (RITALIN) 10 MG tablet Take 3 tablet once daily. 15 tablet 0   • predniSONE (DELTASONE) 20 MG tablet TAKE 3 TABLET X 5 DAYS, THEN TAKE 2 TABLETS X 3 DAYS, THEN TAKE 1 TABLET X 2 DAYS 23 tablet 0     No facility-administered medications prior to visit.      Allergies   Allergen Reactions   • Sulfa Antibiotics Rash     **CENTRICITY CONVERTED DATA**: CRITICAL: Y :SULFA :REACTION: Erythema nodosum   • Penicillins Hives     **CENTRICITY CONVERTED DATA**: CRITICAL: Y :PENICILLIN :REACTION: Hives  **CENTRICITY CONVERTED DATA**: CRITICAL: Y :PENICILLIN :REACTION: Hives       Past Surgical History:   Procedure Laterality Date   • DILATATION AND CURETTAGE      aub    • EYE SURGERY  1975/1978   • TONSILLECTOMY      1987   • WISDOM TOOTH EXTRACTION       family history includes Endometrial cancer in her mother; Heart disease in her father; Hyperlipidemia in her father.   reports that she has never smoked. She has never used smokeless tobacco. She reports that she does not currently use alcohol. She reports that she does not use drugs.     /70 (BP Location: Left arm, Patient Position: Sitting, Cuff Size: Adult)   Pulse 61   Temp 94.1 °F (34.5 °C) (Infrared)   Resp 16   Ht 154.9 cm (60.98\")   Wt 59.7 kg (131 lb 9.6 oz)   SpO2 99%   BMI 24.88 kg/m²   Physical Exam  HENT:      Head: Normocephalic and atraumatic.      Mouth/Throat:      Mouth: Mucous membranes are moist.      Pharynx: Oropharynx is clear.   Eyes:      Extraocular Movements: Extraocular movements intact.      Conjunctiva/sclera: Conjunctivae normal.      Pupils: Pupils are equal, round, and reactive to light.   Cardiovascular:      Rate and Rhythm: Normal rate and regular rhythm.   Pulmonary:      Effort: Pulmonary effort " is normal.      Breath sounds: Normal breath sounds.   Abdominal:      General: Bowel sounds are normal.      Palpations: Abdomen is soft.   Musculoskeletal:         General: Normal range of motion.      Cervical back: Neck supple.   Skin:     General: Skin is warm.      Capillary Refill: Capillary refill takes less than 2 seconds.   Neurological:      General: No focal deficit present.      Mental Status: She is alert and oriented to person, place, and time. Mental status is at baseline.   Psychiatric:         Mood and Affect: Mood normal.          Diagnoses and all orders for this visit:    1. Attention deficit hyperactivity disorder (ADHD), predominantly inattentive type (Primary)  -     CBC Auto Differential  -     Comprehensive Metabolic Panel  -     Lipid Panel With / Chol / HDL Ratio  -     TSH  -     Urine Drug Screen - Urine, Clean Catch  -     methylphenidate (RITALIN) 10 MG tablet; Take 3 tablet once daily.  Dispense: 90 tablet; Refill: 0  -     Ambulatory Referral to Psychiatry    2. High risk medication use  -     Urine Drug Screen - Urine, Clean Catch    3. Encounter for medical examination to establish care  Comments:  Labs have been ordered, will follow up    Deins reviewed  Patient is informed that for ADHD prescription patient will need to do urine drug screen at least 3 times in a year and will need to come for follow-up every 3 months.  Patient would like to get referral to psychiatry as she is more comfortable in doing telehealth visits if possible and does not think she will be able to come every 3 months in person for follow-up  Patient is informed about the side effect of the medication which include drug dependency, loss of appetite/sleep changes, palpitation, elevated blood pressure, digital ulceration etc.  Psychiatry referral placed  Urine drug screen ordered    Needs yearly Flu vaccine  Dental visit and exam every year  Seat Belt always when driving or riding cars  Safe sex life to  avoid STD  Healthy heart diet  Exercise 3 times a week    Follow Up  3 months

## 2023-02-25 LAB
AMPHETAMINES UR QL SCN: NEGATIVE NG/ML
BARBITURATES UR QL SCN: NEGATIVE NG/ML
BENZODIAZ UR QL SCN: NEGATIVE NG/ML
BZE UR QL SCN: NEGATIVE NG/ML
CANNABINOIDS UR QL SCN: NEGATIVE NG/ML
CREAT UR-MCNC: 29.1 MG/DL (ref 20–300)
LABORATORY COMMENT REPORT: NORMAL
METHADONE UR QL SCN: NEGATIVE NG/ML
OPIATES UR QL SCN: NEGATIVE NG/ML
OXYCODONE+OXYMORPHONE UR QL SCN: NEGATIVE NG/ML
PCP UR QL: NEGATIVE NG/ML
PH UR: 6.4 [PH] (ref 4.5–8.9)
PROPOXYPH UR QL SCN: NEGATIVE NG/ML

## 2023-03-09 DIAGNOSIS — F90.0 ATTENTION DEFICIT HYPERACTIVITY DISORDER (ADHD), PREDOMINANTLY INATTENTIVE TYPE: ICD-10-CM

## 2023-03-09 NOTE — TELEPHONE ENCOUNTER
Rx Refill Note  Requested Prescriptions     Pending Prescriptions Disp Refills   • methylphenidate (RITALIN) 10 MG tablet 90 tablet 0     Sig: Take 3 tablet once daily.      Last office visit with prescribing clinician: 1/16/2023   Last telemedicine visit with prescribing clinician: 4/17/2023   Next office visit with prescribing clinician: 4/17/2023                         Would you like a call back once the refill request has been completed: [] Yes [] No    If the office needs to give you a call back, can they leave a voicemail: [] Yes [] No    Selina Adame MA  03/09/23, 14:19 EST

## 2023-03-10 RX ORDER — METHYLPHENIDATE HYDROCHLORIDE 10 MG/1
TABLET ORAL
Qty: 90 TABLET | Refills: 0 | Status: SHIPPED | OUTPATIENT
Start: 2023-03-10

## 2023-04-17 ENCOUNTER — OFFICE VISIT (OUTPATIENT)
Dept: FAMILY MEDICINE CLINIC | Facility: CLINIC | Age: 53
End: 2023-04-17
Payer: COMMERCIAL

## 2023-04-17 VITALS
HEART RATE: 56 BPM | SYSTOLIC BLOOD PRESSURE: 120 MMHG | HEIGHT: 61 IN | TEMPERATURE: 98.2 F | OXYGEN SATURATION: 98 % | BODY MASS INDEX: 25.11 KG/M2 | DIASTOLIC BLOOD PRESSURE: 70 MMHG | WEIGHT: 133 LBS

## 2023-04-17 DIAGNOSIS — F90.0 ATTENTION DEFICIT HYPERACTIVITY DISORDER (ADHD), PREDOMINANTLY INATTENTIVE TYPE: Primary | ICD-10-CM

## 2023-04-17 PROCEDURE — 99213 OFFICE O/P EST LOW 20 MIN: CPT | Performed by: STUDENT IN AN ORGANIZED HEALTH CARE EDUCATION/TRAINING PROGRAM

## 2023-04-17 RX ORDER — METHYLPHENIDATE HYDROCHLORIDE 10 MG/1
TABLET ORAL
Qty: 90 TABLET | Refills: 0 | Status: SHIPPED | OUTPATIENT
Start: 2023-04-17

## 2023-04-17 NOTE — PROGRESS NOTES
"Chief Complaint  Follow-up and ADHD    Subjective        Trish Corrales presents to [facility]  History of Present Illness  For follow-up on the ADHD.  Patient stated she is not encountering any side effects with the medication but does not feel like her medications are helping her as it used to be.  Patient stated she would like to do genetic testing to find out the reason why she is not getting benefit from the medication.  Patient stated she had already spoken to one of the psychiatrist but she does not want to continue care with him so she has some other recommendations and she will make appointments with another psychiatrist soon.  Does not want to try any other ADHD meds.  Review of system is negative for fever, headache, chest pain, shortness of breath, palpitation, nausea, vomiting, any recent change in bladder habits.        Objective   Vital Signs:  /70   Pulse 56   Temp 98.2 °F (36.8 °C)   Ht 154.9 cm (60.98\")   Wt 60.3 kg (133 lb)   SpO2 98%   BMI 25.14 kg/m²   Estimated body mass index is 25.14 kg/m² as calculated from the following:    Height as of this encounter: 154.9 cm (60.98\").    Weight as of this encounter: 60.3 kg (133 lb).       BMI is within normal parameters. No other follow-up for BMI required.      Physical Exam  HENT:      Head: Normocephalic and atraumatic.      Mouth/Throat:      Mouth: Mucous membranes are moist.      Pharynx: Oropharynx is clear.   Eyes:      Extraocular Movements: Extraocular movements intact.      Conjunctiva/sclera: Conjunctivae normal.      Pupils: Pupils are equal, round, and reactive to light.   Cardiovascular:      Rate and Rhythm: Normal rate and regular rhythm.   Pulmonary:      Effort: Pulmonary effort is normal.      Breath sounds: Normal breath sounds.   Abdominal:      General: Bowel sounds are normal.      Palpations: Abdomen is soft.   Musculoskeletal:         General: Normal range of motion.      Cervical back: " Neck supple.   Skin:     General: Skin is warm.      Capillary Refill: Capillary refill takes less than 2 seconds.   Neurological:      General: No focal deficit present.      Mental Status: She is alert and oriented to person, place, and time. Mental status is at baseline.   Psychiatric:         Mood and Affect: Mood normal.        Result Review :                   Assessment and Plan   Diagnoses and all orders for this visit:    1. Attention deficit hyperactivity disorder (ADHD), predominantly inattentive type (Primary)  -     methylphenidate (RITALIN) 10 MG tablet; Take 3 tablet once daily.  Dispense: 90 tablet; Refill: 0         Denis reviewed.  Patient is taking medication as prescribed.  Is aware of the side effects which include dependence, tolerance, sleep issues, appetite issues, palpitation etc.  Denies having any side effects  RTC in 3 months    Patient is advised to do labs, she has not done labs yet.    Follow Up   No follow-ups on file.  Patient was given instructions and counseling regarding her condition or for health maintenance advice. Please see specific information pulled into the AVS if appropriate.

## 2023-05-01 ENCOUNTER — PROCEDURE VISIT (OUTPATIENT)
Dept: OBSTETRICS AND GYNECOLOGY | Age: 53
End: 2023-05-01
Payer: COMMERCIAL

## 2023-05-01 ENCOUNTER — OFFICE VISIT (OUTPATIENT)
Dept: OBSTETRICS AND GYNECOLOGY | Age: 53
End: 2023-05-01
Payer: COMMERCIAL

## 2023-05-01 VITALS
HEIGHT: 61 IN | DIASTOLIC BLOOD PRESSURE: 68 MMHG | WEIGHT: 133 LBS | BODY MASS INDEX: 25.11 KG/M2 | SYSTOLIC BLOOD PRESSURE: 110 MMHG

## 2023-05-01 DIAGNOSIS — Z01.419 ENCOUNTER FOR GYNECOLOGICAL EXAMINATION WITHOUT ABNORMAL FINDING: Primary | ICD-10-CM

## 2023-05-01 DIAGNOSIS — Z12.4 SCREENING FOR MALIGNANT NEOPLASM OF THE CERVIX: ICD-10-CM

## 2023-05-01 DIAGNOSIS — Z12.31 VISIT FOR SCREENING MAMMOGRAM: Primary | ICD-10-CM

## 2023-05-01 NOTE — PROGRESS NOTES
"Subjective   Trish Corrales is a 53 y.o. female presents for  annual visit with mammogram @ office today , last pap 4/20/22 , no PMB - no periods since  8/2021,   h/o D&C 2019 , colonoscopy 7/2020 due again 2025 , bilateral lift on breasts and reduction on right breast 6 months ago - October 2022 dr ott - uses [employer] (estrogen and testosterone ) physician [place] [facility].  Just bought her own house. No longer in a relationship with the doctor.      I last saw her last year for annual visit.  She denied any PMB or hot flashes , reports no gyno problems.   No periods since August 2021. Still living in parent's basement - really liking it- dating someone she really likes.      History of Present Illness    The following portions of the patient's history were reviewed and updated as appropriate: allergies, current medications, past family history, past medical history, past social history, past surgical history and problem list.    Review of Systems   Constitutional: Negative for chills, fatigue and fever.   Gastrointestinal: Negative for abdominal distention and abdominal pain.   Genitourinary: Negative for dyspareunia, dysuria, menstrual problem, pelvic pain, vaginal bleeding, vaginal discharge and vaginal pain.   All other systems reviewed and are negative.  /68   Ht 154.9 cm (61\")   Wt 60.3 kg (133 lb)   LMP  (LMP Unknown)   BMI 25.13 kg/m²       Objective   Physical Exam  Vitals and nursing note reviewed.   Constitutional:       Appearance: Normal appearance. She is well-developed and normal weight.   Neck:      Thyroid: No thyromegaly.   Pulmonary:      Effort: Pulmonary effort is normal.   Chest:   Breasts:     Right: No mass, nipple discharge, skin change or tenderness.      Left: No mass, nipple discharge, skin change or tenderness.   Abdominal:      General: There is no distension.      Palpations: Abdomen is soft.      Tenderness: There is no abdominal tenderness.   Genitourinary:     " General: Normal vulva.      Exam position: Lithotomy position.      Labia:         Right: No rash or lesion.         Left: No rash or lesion.       Vagina: Normal. No vaginal discharge or bleeding.      Cervix: No friability, lesion or cervical bleeding.      Uterus: Not enlarged and not tender.       Adnexa:         Right: No mass or tenderness.          Left: No mass or tenderness.     Musculoskeletal:         General: Normal range of motion.      Cervical back: Normal range of motion.   Skin:     General: Skin is warm and dry.      Findings: No rash.   Neurological:      Mental Status: She is alert and oriented to person, place, and time.   Psychiatric:         Mood and Affect: Mood normal.         Behavior: Behavior normal.           Assessment & Plan   Diagnoses and all orders for this visit:    1. Encounter for gynecological examination without abnormal finding (Primary)    2. Screening for malignant neoplasm of the cervix  -     IgP, Aptima HPV      Counseling was given to patient for the following topics: instructions for management, importance of treatment compliance and self-breast exams .   Return in about 1 year (around 5/1/2024) for Annual physical with [employer] .

## 2023-06-02 ENCOUNTER — OFFICE VISIT (OUTPATIENT)
Dept: FAMILY MEDICINE CLINIC | Facility: CLINIC | Age: 53
End: 2023-06-02

## 2023-06-02 VITALS
SYSTOLIC BLOOD PRESSURE: 118 MMHG | RESPIRATION RATE: 16 BRPM | HEART RATE: 75 BPM | DIASTOLIC BLOOD PRESSURE: 70 MMHG | WEIGHT: 134.6 LBS | HEIGHT: 61 IN | BODY MASS INDEX: 25.41 KG/M2 | OXYGEN SATURATION: 98 % | TEMPERATURE: 98.6 F

## 2023-06-02 DIAGNOSIS — D17.1 LIPOMA OF TORSO: Primary | ICD-10-CM

## 2023-06-02 PROCEDURE — 99213 OFFICE O/P EST LOW 20 MIN: CPT | Performed by: NURSE PRACTITIONER

## 2023-06-02 NOTE — PROGRESS NOTES
"Subjective     Trish Corrales is a 53 y.o.. female.     Cyst  This is a recurrent problem. Episode onset: 10 months. The problem has been unchanged. Associated symptoms comments: Consistently the same size; not tender to touch; no discoloration, no swelling  . Nothing aggravates the symptoms. She has tried nothing for the symptoms. The treatment provided no relief.       The following portions of the patient's history were reviewed and updated as appropriate: allergies, current medications, past family history, past medical history, past social history, past surgical history and problem list.    Past Medical History:   Diagnosis Date   • ADHD (attention deficit hyperactivity disorder)    • Allergic 1970    Penecillin, Sulfa   • Depression    • Hyperlipidemia 2021 207   • Urinary tract infection 1987, 2000       Past Surgical History:   Procedure Laterality Date   • DILATATION AND CURETTAGE      aub    • EYE SURGERY  1975/1978   • TONSILLECTOMY      1987   • WISDOM TOOTH EXTRACTION         Review of Systems   Constitutional: Negative.    Respiratory: Negative.    Cardiovascular: Negative.    Skin:        Bump to right shoulder       Allergies   Allergen Reactions   • Sulfa Antibiotics Rash     **CENTRICITY CONVERTED DATA**: CRITICAL: Y :SULFA :REACTION: Erythema nodosum   • Penicillins Hives     **CENTRICITY CONVERTED DATA**: CRITICAL: Y :PENICILLIN :REACTION: Hives  **CENTRICITY CONVERTED DATA**: CRITICAL: Y :PENICILLIN :REACTION: Hives         Objective     Vitals:    06/02/23 1114   BP: 118/70   Pulse: 75   Resp: 16   Temp: 98.6 °F (37 °C)   SpO2: 98%   Weight: 61.1 kg (134 lb 9.6 oz)   Height: 154.9 cm (60.98\")     Body mass index is 25.45 kg/m².    Physical Exam  Vitals reviewed.   HENT:      Head: Normocephalic.   Eyes:      Pupils: Pupils are equal, round, and reactive to light.   Cardiovascular:      Rate and Rhythm: Normal rate and regular rhythm.   Pulmonary:      Effort: Pulmonary effort is normal.      " Breath sounds: Normal breath sounds.   Musculoskeletal:         General: Normal range of motion.   Skin:     General: Skin is warm and dry.      Comments: Right shoulder with moderate size soft lipoma noted; no erythema, no tenderness noted   Neurological:      Mental Status: She is alert and oriented to person, place, and time.   Psychiatric:         Behavior: Behavior normal.           Current Outpatient Medications:   •  Lactobacillus Rhamnosus, GG, (PROBIOTIC COLIC PO), Take  by mouth. Take 2 in the morning., Disp: , Rfl:   •  methylphenidate (RITALIN) 10 MG tablet, Take 3 tablet once daily., Disp: 90 tablet, Rfl: 0  •  Progesterone (PROMETRIUM) 200 MG capsule, Take 1 capsule by mouth Daily., Disp: , Rfl:   •  vitamin D3 125 MCG (5000 UT) capsule capsule, Take 1 capsule by mouth Daily., Disp: , Rfl:         Diagnoses and all orders for this visit:    1. Lipoma of torso (Primary)        Patient Instructions   Discussed diagnosis of lipoma, discussed treatment options; Patient to call back for general surgeon referral when/if ready.      Return if symptoms worsen or fail to improve, for Dr. Boone as needed/as recommended.

## 2023-06-02 NOTE — PATIENT INSTRUCTIONS
Discussed diagnosis of lipoma, discussed treatment options; Patient to call back for general surgeon referral when/if ready.

## 2023-06-06 ENCOUNTER — E-VISIT (OUTPATIENT)
Dept: FAMILY MEDICINE CLINIC | Facility: TELEHEALTH | Age: 53
End: 2023-06-06
Payer: COMMERCIAL

## 2023-06-07 ENCOUNTER — OFFICE VISIT (OUTPATIENT)
Dept: FAMILY MEDICINE CLINIC | Facility: CLINIC | Age: 53
End: 2023-06-07
Payer: COMMERCIAL

## 2023-06-07 VITALS
TEMPERATURE: 97.3 F | HEIGHT: 61 IN | BODY MASS INDEX: 26.24 KG/M2 | HEART RATE: 60 BPM | WEIGHT: 139 LBS | OXYGEN SATURATION: 97 % | RESPIRATION RATE: 16 BRPM | SYSTOLIC BLOOD PRESSURE: 110 MMHG | DIASTOLIC BLOOD PRESSURE: 50 MMHG

## 2023-06-07 DIAGNOSIS — S20.461D TICK BITE OF RIGHT BACK WALL OF THORAX, SUBSEQUENT ENCOUNTER: Primary | ICD-10-CM
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
## 2023-06-07 NOTE — PROGRESS NOTES
"Chief Complaint  Tick Removal (Tick bite)    Subjective        Trish Corrales presents to [facility]  History of Present Illness  Patient had tick exposure a month ago and as per the patient she removed tick within 24 hours and tick did not look like engorged with blood.   Pt stated she heard about lyme disease and was concerned about that and wanted to discuss today  On antibiotics for UTI.  Tick Removal      Objective   Vital Signs:  /50   Pulse 60   Temp 97.3 °F (36.3 °C)   Resp 16   Ht 154.9 cm (60.98\")   Wt 63 kg (139 lb)   SpO2 97%   BMI 26.28 kg/m²   Estimated body mass index is 26.28 kg/m² as calculated from the following:    Height as of this encounter: 154.9 cm (60.98\").    Weight as of this encounter: 63 kg (139 lb).             Physical Exam  HENT:      Head: Normocephalic and atraumatic.      Mouth/Throat:      Mouth: Mucous membranes are moist.      Pharynx: Oropharynx is clear.   Eyes:      Extraocular Movements: Extraocular movements intact.      Conjunctiva/sclera: Conjunctivae normal.      Pupils: Pupils are equal, round, and reactive to light.   Cardiovascular:      Rate and Rhythm: Normal rate and regular rhythm.   Pulmonary:      Effort: Pulmonary effort is normal.      Breath sounds: Normal breath sounds.   Abdominal:      General: Bowel sounds are normal.      Palpations: Abdomen is soft.   Musculoskeletal:         General: Normal range of motion.      Cervical back: Neck supple.   Skin:     General: Skin is warm.      Capillary Refill: Capillary refill takes less than 2 seconds.      Comments: Small pinpoint taras seen on skin where tick was there, doesnot look like typical target rash.   Neurological:      General: No focal deficit present.      Mental Status: She is alert and oriented to person, place, and time. Mental status is at baseline.   Psychiatric:         Mood and Affect: Mood normal.      Result Review :                   Assessment and Plan "   Diagnoses and all orders for this visit:    1. Tick bite of right back wall of thorax, subsequent encounter (Primary)  Comments:  its already month, pt doesnot fit into criteria for lyme prophylaxis    Advise patient to come back if fever or any symptoms develop as doesnot think since tick was not attached for 36 hrs that pt need to be treated with doxycycline at this point.         Follow Up   No follow-ups on file.  Patient was given instructions and counseling regarding her condition or for health maintenance advice. Please see specific information pulled into the AVS if appropriate.

## 2023-06-07 NOTE — E-VISIT TREATED
Chief Complaint: Bladder infection (UTI)   Patient introduction   Patient is 53-year-old female.   Patient has had dysuria and frequent urination for 1 to 3 days.   Urine is clear with no unusual odor.   General presentation   Patient has not had a fever. No nausea or vomiting.   No stomach/pelvic pain.   No back pain.   No flank pain.   Has not tried acetaminophen, ibuprofen, or phenazopyridine for current symptoms.   Previous history of UTI. Current symptoms feel exactly the same as previous UTIs. Received treatment for UTI 0 times in last year.   No known history of yeast infections as a result of taking antibiotics for past UTIs.   No history of pyelonephritis. No history of kidney stones.   No sexual intercourse in the past week. Does not use diaphragm. No unprotected sexual intercourse with a new partner in the last 2 weeks.   Patient is not being treated for diabetes mellitus.   Review of red flags/alarm symptoms:    No recent hospitalizations or nursing home care (last 3 months)    No history of renal failure    No recent history of urologic instrumentation    No anatomic abnormalities of the urinary tract    No abnormal vaginal discharge    No visible vaginal sores    No pain with sexual intercourse    No abnormal vaginal bleeding or spotting   Pregnancy/menstrual status/breastfeeding:   Patient is menopausal.   Current medications   Currently taking vitamin D3 125 MCG (5000 UT) capsule capsule, methylphenidate 10 MG tablet, PROBIOTIC COLIC PO, and Progesterone 200 MG capsule.   Medication allergies    Penicillins (reaction: raised, red, itchy spots with each spot lasting less than 24 hours)    Sulfa drugs   Medication contraindication review   Not taking ACE inhibitors and ARBs.   No history of Kristina-Danlos syndrome, folate deficiency, G6PD deficiency, high blood pressure, aortic aneurysm or dissection, Marfan syndrome, megaloblastic anemia, mononucleosis, myasthenia gravis, oliguria/anuria, or  peripheral vascular disease.   No known history of amoxicillin-clavulanate-associated cholestatic jaundice or nitrofurantoin-associated cholestatic jaundice.   Past medical history   Immune conditions: No immunocompromising conditions.   No history of cancer.   Patient did not request an excuse note.   Assessment   Uncomplicated acute UTI.   This is the likely diagnosis based on patient's interview responses, including:    Symptom profile    Previous history of UTI    Current symptoms are exactly the same as previous UTIs   No recent history of kidney stones.   Plan   Medications:    nitrofurantoin monohydrate/macrocrystals 100 mg capsule RX 100mg 1 cap PO q12h 5d for infection. This medication is an antibiotic. Take it exactly as directed. You must finish the entire course of medication, even if you feel better after taking the first few doses. Amount is 10 cap.    phenazopyridine 200 mg tablet RX 200mg 1 tab PO tid PRN 2d for pain or discomfort associated with your condition. Amount is 6 tab.   The patient's prescriptions will be sent to:   [id number]   [id number] [facility] [id number]   Phone: [phone]     Fax: [phone]   Education:    Condition and causes    Prevention    Treatment and self-care    When to call provider   Follow-up:   Patient to follow up as needed for progression or lack of improvement in symptoms within 3d.   ----------   Electronically signed by ARIANNE Aquino FNP-BC on 2023-06-06 at 23:43PM   ----------   Patient Interview Transcript:   Knowing about your anatomy is important for diagnosing and treating UTIs. The gender we have on file for you is female, but we realize that this might not tell the whole story. Would you like to tell us more about your anatomy?    No   Not selected:    Yes   Which of these symptoms do you have? Select all that apply.    Pain or burning while urinating    Frequent urination   Not selected:    Sudden urge to  urinate and it's hard to hold the urine in   How long have you had these symptoms? Select one.    1 to 3 days   Not selected:    Less than 24 hours    4 to 6 days    7 to 10 days    More than 10 days   Since your current symptoms started, has it been difficult to start, stop, or delay urination? Select one.    No   Not selected:    Yes   What color is your urine? Select one.    Clear   Not selected:    Cloudy    Yellow    Pink or red   Does your urine smell strange (like ammonia) or stronger than usual? Select one.    No   Not selected:    Yes   Do you also have any of these symptoms? Select all that apply.    No   Not selected:    Fever    Nausea    Vomiting    Pain, pressure, or discomfort in the lower abdomen    Back pain   Do you have any flank pain? The flank is the side of the body between the ribs and the hips.    No   Not selected:    Yes, in my left flank    Yes, in my right flank    Yes, in both my left and right flanks   Do you have any of these vaginal symptoms? Select all that apply.    No   Not selected:    Abnormal vaginal itching    Unscheduled or abnormal vaginal bleeding or spotting    Pain during sex    Visible sores on the vagina    Abnormal vaginal discharge   In the past 2 weeks, have you had a medical device or instrument placed in your urinary tract? Examples include catheters, stents, and nephrostomy tubes. Select one.    No   Not selected:    Yes   Have you recently been hospitalized or been a resident of a nursing home or other long-term care facility? This doesn't include emergency room (ER) visits. Select one.    No   Not selected:    Yes, within the last 2 weeks    Yes, within the last 3 months   Have you ever had severe problems with your kidneys, such as kidney failure? Select one.    No, not that I recall   Not selected:    Yes   Kidney stones    No   Not selected:    Within the last year    More than a year ago   Kidney infection (pyelonephritis)    No   Not selected:    Within the  last year    More than a year ago   Have you ever been diagnosed with any of these? Select all that apply.    No   Not selected:    Urinary reflux    Bladder diverticula    Single (or horseshoe) kidney    Duplicated urethra   Have you recently held your urine for a long time after you felt the urge to go? Select one.    No   Not selected:    Yes   Have you recently avoided eating or drinking so you wouldn't have the urge to urinate as often? Select one.    No   Not selected:    Yes   Do you use a diaphragm? Select one.    No   Not selected:    Yes   Have you gone through menopause? Select one.    I'm going through it now   Not selected:    Yes    No   Have you had sexual intercourse in the past week? Recent sexual intercourse is a risk factor for urinary tract infections. Select one.    No   Not selected:    Yes   Have you had unprotected sexual intercourse with a new partner in the last 2 weeks? Select one.    No   Not selected:    Yes   Have you traveled to any of these countries within the last 3 months? Recent travel to these countries may affect which medication we recommend for your symptoms. Select all that apply.    None of these   Not selected:    [place]    [place]    [place]    [place]   Have you ever had a urinary tract infection (UTI)? A UTI is often called a bladder infection or acute cystitis. Select one.    Yes   Not selected:    No, not that I know of   How much do your current symptoms feel like past UTIs? Select one.    Exactly the same   Not selected:    Mostly the same    Somewhat the same    Totally different   In the past year, how many times have you taken antibiotics for a UTI? Select one.    0   Not selected:    1 to 3    4 or more   Have you ever developed a yeast infection as a result of taking antibiotics? Select one.    No, not that I know of   Not selected:    Yes   UTIs may be more serious when other factors are present. Let's address those now. Are you being treated for type 1 or type 2  diabetes? Select one.    No   Not selected:    Yes   Do you have any of these conditions that can affect the immune system? Scroll to see all options. Select all that apply.    None of these   Not selected:    History of bone marrow transplant    Chronic kidney disease    Chronic liver disease (including cirrhosis)    HIV/AIDS    Inflammatory bowel disease (Crohn's disease or ulcerative colitis)    Lupus    Moderate to severe plaque psoriasis    Multiple sclerosis    Rheumatoid arthritis    Sickle cell anemia    Alpha or beta thalassemia    History of solid organ transplant (kidney, liver, or heart)    History of spleen removal    An autoimmune disorder not listed here (specify)    A condition requiring treatment with long-term use of oral steroids (such as prednisone, prednisolone, or dexamethasone) (specify)   Have you ever been diagnosed with cancer? Select one.    No   Not selected:    Yes, I have cancer now    Yes, but I'm in remission   These last few questions will help us create the right treatment plan for you. Are you being treated for any of these conditions? Select all that apply.    No   Not selected:    Kristina-Danlos syndrome    Folate deficiency    G6PD deficiency    High blood pressure    History of aortic aneurysm or dissection    Marfan syndrome    Megaloblastic anemia    Mono (mononucleosis)    Myasthenia gravis    Oliguria or anuria    Peripheral vascular disease   Have you ever had jaundice as a result of taking amoxicillin-clavulanate (Augmentin) or nitrofurantoin (Macrobid)? Select all that apply.    No   Not selected:    Yes, from amoxicillin-clavulanate (Augmentin)    Yes, from nitrofurantoin (Macrobid, Macrodantin)   Are you taking any of these medications? Select all that apply.    No   Not selected:    An ACE inhibitor such as lisinopril, enalapril, captopril, or benazepril    An angiotensin II receptor blocker (ARB) such as candesartan, irbesartan, losartan, or valsartan   Are you still  "taking these medications listed in your medical record? If you're not taking any of these, click Next. Select all that apply.    vitamin D3 125 MCG (5000 UT) capsule capsule    methylphenidate 10 MG tablet    PROBIOTIC COLIC PO    Progesterone 200 MG capsule   Are you taking any other medications, vitamins, or supplements? Select one.    No   Not selected:    Yes   Have you ever had an allergic or bad reaction to any medication? Select one.    Yes   Not selected:    No   Have you had an allergic or bad reaction to any of these medications? Select all that apply.    Any antibiotic ending with \"-cillin,\" such as penicillin, amoxicillin, ampicillin, dicloxacillin, nafcillin, or piperacillin (brands include Augmentin, Unasyn, and Zosyn)    Sulfamethoxazole-trimethoprim (brands include Bactrim and Septra) or any other sulfa drug   Not selected:    Any antibiotic starting with \"cef-,\" such as cefazolin, cefdinir, cefuroxime, ceftriaxone, ceftazidime, cefepime, or cephalexin (brands include Fortaz and Keflex)    Any antibiotic ending with \"-floxacin,\" such as ciprofloxacin, gemifloxacin, levofloxacin, moxifloxacin, or ofloxacin (brands include Cipro, Factive, Floxin, and Levaquin)    Nitrofurantoin (brands include Furadantin, Macrobid and Macrodantin)    Fluconazole (brand name Diflucan), itraconazole, or terconazole    Trimethoprim (brand name Primsol)    No, not that I know of   When you had an allergic or bad reaction to penicillin or another \"-cillin\" antibiotic, did you have any of these symptoms? Select all that apply.    Raised, red, itchy spots with each spot lasting less than 24 hours   Not selected:    Swelling of the mouth, eyes, lips, or tongue    Blisters or ulcers on the lips, mouth, eyes, or vagina    Peeling skin    Breathing difficulties    Feeling light-headed or faint    A fast heartbeat    Clammy skin    Confusion and anxiety    Collapsing or losing consciousness    Joint pains    Problems with kidneys, " lungs, or liver    None of the above   Have you had an allergic or bad reaction to any of these medications? Select all that apply.    No   Not selected:    Acetaminophen (Tylenol)    Ibuprofen (Advil, Midol, Motrin)    Phenazopyridine (Azo, Baridium, Pyridium, Uricalm, Uristat)   Do you need a doctor's note? A doctor's note confirms that you received care today and states when you can return to school or work. It does not contain information about your diagnosis or treatment plan. Your provider will make the final decision on whether to give you a doctor's note. Doctor's notes CANNOT be backdated. Select one.    No   Not selected:    Today only (1 day)    Today and tomorrow (2 days)    3 days   Is there anything you'd like to add about your symptoms? Please limit your comments to the symptoms asked about in this interview. If you include comments about other concerns, your provider may recommend that you be seen in person.   The patient did not enter any additional information.   ----------   Medical history   Medical history data does not currently exist for this patient.

## 2023-06-07 NOTE — EXTERNAL PATIENT INSTRUCTIONS
Note   I have prescribed Macrobid and Pyridium. If your symptoms do not improve or become worse, follow up with your doctor.   Diagnosis   Urinary tract infection (UTI)   My name is ARIANNE Aquino FNP-BC. I'm a healthcare provider at [facility]. After reviewing your interview, I see you have a urinary tract infection (UTI).   Medications   Your pharmacy   [id number] [address]  [place] [id number] [phone]     Prescription   Nitrofurantoin monohydrate/macrocrystalline (100mg): Take 1 capsule by mouth every 12 hours for 5 days for infection. This medication is an antibiotic. Take it exactly as directed. You must finish the entire course of medication, even if you feel better after taking the first few doses.   Phenazopyridine (200mg): Take 1 tablet by mouth 3 times a day as needed for 2 days for pain or discomfort associated with your condition.    I've given you a prescription dose of phenazopyridine. If it's more affordable or convenient, you may use the equivalent amount of non-prescription phenazopyridine. For example, instead of taking one 200 mg phenazopyridine tablet, you may take two 95 mg phenazopyridine tablets.   About your diagnosis   A UTI is an infection of one or more parts of the urinary tract, most commonly the bladder.   Most UTIs are caused by bacteria (usually E. coli) that travel up the urethra and into the bladder. I see that you have some common signs and symptoms of a UTI:    Pain or burning while urinating    Frequent urination    Symptoms that feel a lot like past UTIs   Fortunately, most UTIs aren't serious, and they're easily treated with antibiotics. Make sure you take all of the antibiotic pills given to you, even if you start to feel better after the first few doses. Otherwise, the UTI might come back.   What to expect   If you follow this treatment plan, you should start to feel better within 1 to 2 days.   When to seek care   Call us  at [phone]   with any sudden or unexpected symptoms.    Symptoms that don't improve or get worse in the next 48 hours    Fever that goes above 101F or lasts longer than 24 hours    Shaking or chills    Nausea or vomiting    Severe flank pain (pain in your back or side)   Other treatment    Rest and drink plenty of water    Urinate frequently and when you first feel the urge    Place a heating pad on your back or stomach to help relieve some of the discomfort   Prevention    Drink a lot of liquids to help flush bacteria from your system. Water is best. Try for six to eight, 8-ounce glasses a day on a regular basis.    Urinate often and when you first feel the urge. Bacteria can grow when urine stays in the bladder too long. Urinate after sex to flush away bacteria.    After using the toilet, always wipe from front to back. This step is most important after a bowel movement. Wiping from front to back prevents bacteria normally found in stool from entering the urinary tract.   Your provider   Your diagnosis was provided by ARIANNE Aquino, JERICHO-BC, a member of your trusted care team at [facility].   If you have any questions, call us at [phone]  .

## 2023-12-28 ENCOUNTER — TELEPHONE (OUTPATIENT)
Dept: OBSTETRICS AND GYNECOLOGY | Age: 53
End: 2023-12-28
Payer: COMMERCIAL

## 2023-12-28 NOTE — TELEPHONE ENCOUNTER
----- Message from Trish Corrales sent at 12/28/2023 11:34 AM EST -----  Regarding: Appointment Request  Contact: [phone]  Appointment Request From: Trish Corrales    With Provider: Mihaela Garcia [[facility]]    Preferred Date Range: 12/28/2023 – 1/5/2024    Preferred Times: Any Time    Reason for visit: In-Office Procedure    Comments:  I am taking HR and having minimal cramping. Just want to have my uterus lining checked to make sure everything looks ok.

## 2023-12-31 ENCOUNTER — TELEPHONE (OUTPATIENT)
Dept: OBSTETRICS AND GYNECOLOGY | Age: 53
End: 2023-12-31
Payer: COMMERCIAL

## 2023-12-31 NOTE — TELEPHONE ENCOUNTER
Please notify patient that her uterine lining is nice and thin at less than 3 mm.  Other than known fibroids, ultrasound is unremarkable.  Please let me know if she has further questions.

## 2024-04-02 ENCOUNTER — TELEPHONE (OUTPATIENT)
Dept: FAMILY MEDICINE CLINIC | Facility: CLINIC | Age: 54
End: 2024-04-02

## 2024-04-02 NOTE — TELEPHONE ENCOUNTER
Caller: Trish Corrales    Relationship: Self    Best call back number: [phone]    What is the medical concern/diagnosis: LIPOMA    What specialty or service is being requested: GENERAL SURGEON    Any additional details: STATED THAT THEY HAD SEEN [facility] FOR A SPOT ON THEIR SHOULDER LAST YEAR AND THEY HAD DECIDED TO WAIT BEFORE MOVING FORWARD IN THE TREATMENT FOR THIS. STATED THAT THEY HAVE DECIDED THAT IT IS TIME TO HAVE THIS TAKEN CARE OF NOW. STATED THAT THEY ARE NOT SURE WHOM THEY WERE GOING TO BE REFERRED TO FOR THIS. PLEASE CALL AND ADVISE FURTHER

## 2024-04-04 NOTE — TELEPHONE ENCOUNTER
Caller: Trish Corrales    Relationship: Self    Best call back number: [phone]     What is the best time to reach you: ANYTIME    Who are you requesting to speak with (clinical staff, provider,  specific staff member): CLINICAL    What was the call regarding: PATIENT STATED SHE WAS ALREADY SEEN BY [facility] FOR THIS CONCERN, AND STATED SHE WAS ADVISED TO CALL WHEN SHE WAS READY FOR A REFERRAL.    PATIENT STATED SHE DOES NOT FEEL SHE SHOULD NEED TO BE SEEN AGAIN FOR THIS, AND IS REQUESTING A REFERRAL BE ENTERED FOR THIS LIPOMA.    PLEASE CALL IF NEEDED TO DISCUSS.

## 2024-04-04 NOTE — TELEPHONE ENCOUNTER
"Relay     \"Patient has not been seen in the office since June 2023. Please schedule appointment to discuss referral with PCP. \"                  "

## 2024-04-26 ENCOUNTER — OFFICE VISIT (OUTPATIENT)
Dept: SURGERY | Facility: CLINIC | Age: 54
End: 2024-04-26
Payer: COMMERCIAL

## 2024-04-26 VITALS
WEIGHT: 126.7 LBS | HEIGHT: 61 IN | SYSTOLIC BLOOD PRESSURE: 110 MMHG | DIASTOLIC BLOOD PRESSURE: 80 MMHG | BODY MASS INDEX: 23.92 KG/M2

## 2024-04-26 DIAGNOSIS — D17.1 LIPOMA OF TORSO: Primary | ICD-10-CM

## 2024-04-26 DIAGNOSIS — D17.1 LIPOMA OF BACK: ICD-10-CM

## 2024-04-26 PROCEDURE — 99202 OFFICE O/P NEW SF 15 MIN: CPT | Performed by: SURGERY

## 2024-04-26 RX ORDER — MULTIVIT WITH MINERALS/LUTEIN
1000 TABLET ORAL DAILY
COMMUNITY

## 2024-04-26 RX ORDER — DEXTROAMPHETAMINE SACCHARATE, AMPHETAMINE ASPARTATE MONOHYDRATE, DEXTROAMPHETAMINE SULFATE AND AMPHETAMINE SULFATE 6.25; 6.25; 6.25; 6.25 MG/1; MG/1; MG/1; MG/1
25 CAPSULE, EXTENDED RELEASE ORAL EVERY MORNING
COMMUNITY
Start: 2024-03-21

## 2024-04-26 NOTE — PROGRESS NOTES
ASSESSMENT/PLAN:    54-year-old lady with approximately 3 cm lipoma right upper back.  She wishes to have this excised.  She has a trip planned soon and will return for in  office procedure on 5/15/2024.    CC:     Lipoma    HPI:    54-year-old lady presents with palpable mass right upper back.  Relevant review of systems negative other than presenting complaints.    ENDOSCOPY:   Colonoscopy at age 50 that was normal    SOCIAL HISTORY:   Denies tobacco use  Denies alcohol use    FAMILY HISTORY:    Colorectal cancer: Negative    PREVIOUS ABDOMINAL SURGERY    None    PAST MEDICAL HISTORY:    Unremarkable    MEDICATIONS:   Progesterone  Adderall    ALLERGIES:   Sulfa-rash  Penicillin-hives (childhood allergy)    PHYSICAL EXAM:   Constitutional: No acute distress  Vital signs:   Weight: 126 pounds  Height: 61 inches  BMI: 24  Blood pressure 110/80  Skin: Approximately 3 cm freely movable lipoma right upper back    TERRELL FABIAN M.D.

## 2024-05-10 ENCOUNTER — TELEPHONE (OUTPATIENT)
Dept: OBSTETRICS AND GYNECOLOGY | Age: 54
End: 2024-05-10

## 2024-05-10 NOTE — TELEPHONE ENCOUNTER
Provider: DR. BONDS    Caller: TAYO MACHADO    Relationship to Patient: SELF    Pharmacy:     Phone Number: [phone]    Reason for Call: RESCHEDULE MAMMOGRAM    When was the patient last seen: 05.01.23    PATIENT WOULD LIKE TO RESCHEDULE HER MAMMOGRAM THAT WAS SCHEDULE FOR TODAY 05.10.24.    PATIENT CAN BE REACHED [phone]    THANK YOU

## 2024-05-15 ENCOUNTER — OFFICE VISIT (OUTPATIENT)
Dept: SURGERY | Facility: CLINIC | Age: 54
End: 2024-05-15
Payer: COMMERCIAL

## 2024-05-15 DIAGNOSIS — D17.1 LIPOMA OF BACK: Primary | ICD-10-CM

## 2024-05-15 PROCEDURE — 88304 TISSUE EXAM BY PATHOLOGIST: CPT | Performed by: SURGERY

## 2024-05-16 NOTE — PROGRESS NOTES
Office procedure    Preoperative diagnosis: Right upper back subcutaneous lipoma    Postoperative diagnosis and findings: 3.5 cm right upper back intramuscular lipoma    Procedure: Excision of 3.5 cm right upper back intramuscular lipoma    Surgeon: Bala    Anesthesia: 1% lidocaine with epinephrine    Blood loss: Minimal    Specimen: Lipoma    Description: In prone position prepped and draped usual sterile manner.  1% lidocaine with epinephrine infiltrated locally.  Transverse incision made over the palpable lesion.  The lesion was not located in the subcutaneous layer.  I continued the incision through the superficial muscular fascia and identified a 3.5 cm lipoma which was excised intact.  Hemostasis achieved with the Hyfrecator.  Superficial muscular fascia closed with interrupted 3-0 Vicryl.  Skin closed with 3-0 Vicryl deep dermal and 5-0 Vicryl subcuticular followed by Exofin.  Tolerated well.

## 2024-05-17 ENCOUNTER — HOSPITAL ENCOUNTER (OUTPATIENT)
Facility: HOSPITAL | Age: 54
Discharge: HOME | End: 2024-05-17
Admitting: OBSTETRICS & GYNECOLOGY
Payer: COMMERCIAL

## 2024-05-17 DIAGNOSIS — Z12.31 VISIT FOR SCREENING MAMMOGRAM: ICD-10-CM

## 2024-05-17 PROCEDURE — 77067 SCR MAMMO BI INCL CAD: CPT

## 2024-05-17 PROCEDURE — 77063 BREAST TOMOSYNTHESIS BI: CPT

## 2024-05-18 LAB
LAB AP CASE REPORT: NORMAL
PATH REPORT.FINAL DX SPEC: NORMAL
PATH REPORT.GROSS SPEC: NORMAL

## 2024-05-21 DIAGNOSIS — R92.1 BREAST CALCIFICATION, RIGHT: ICD-10-CM

## 2024-05-21 DIAGNOSIS — R92.8 ABNORMAL MAMMOGRAM: Primary | ICD-10-CM

## 2024-05-22 ENCOUNTER — TELEPHONE (OUTPATIENT)
Dept: OBSTETRICS AND GYNECOLOGY | Age: 54
End: 2024-05-22
Payer: COMMERCIAL

## 2024-05-22 NOTE — TELEPHONE ENCOUNTER
Returned call to patient.  Discussed her current mammogram results and recommendation of additional imaging and right breast.  Advised patient that they saw all calcifications which are unlikely to be scar tissue.  Patient is agreeable to additional imaging.    Bailey, can you please schedule?.

## 2024-05-29 ENCOUNTER — APPOINTMENT (OUTPATIENT)
Dept: WOMENS IMAGING | Facility: HOSPITAL | Age: 54
End: 2024-05-29
Payer: COMMERCIAL

## 2024-05-29 PROCEDURE — 77065 DX MAMMO INCL CAD UNI: CPT | Performed by: RADIOLOGY

## 2024-05-29 PROCEDURE — 77061 BREAST TOMOSYNTHESIS UNI: CPT | Performed by: RADIOLOGY

## 2024-05-29 PROCEDURE — G0279 TOMOSYNTHESIS, MAMMO: HCPCS | Performed by: RADIOLOGY

## 2024-06-07 DIAGNOSIS — R92.1 BREAST CALCIFICATION, RIGHT: ICD-10-CM

## 2024-06-07 DIAGNOSIS — Z09 FOLLOW-UP EXAM, 3-6 MONTHS SINCE PREVIOUS EXAM: Primary | ICD-10-CM

## 2024-06-14 ENCOUNTER — OFFICE VISIT (OUTPATIENT)
Dept: OBSTETRICS AND GYNECOLOGY | Age: 54
End: 2024-06-14
Payer: COMMERCIAL

## 2024-06-14 VITALS
HEIGHT: 61 IN | BODY MASS INDEX: 23.6 KG/M2 | WEIGHT: 125 LBS | DIASTOLIC BLOOD PRESSURE: 68 MMHG | SYSTOLIC BLOOD PRESSURE: 120 MMHG

## 2024-06-14 DIAGNOSIS — Z12.31 BREAST CANCER SCREENING BY MAMMOGRAM: ICD-10-CM

## 2024-06-14 DIAGNOSIS — Z12.4 SCREENING FOR MALIGNANT NEOPLASM OF THE CERVIX: ICD-10-CM

## 2024-06-14 DIAGNOSIS — Z01.419 ENCOUNTER FOR GYNECOLOGICAL EXAMINATION WITHOUT ABNORMAL FINDING: Primary | ICD-10-CM

## 2024-06-14 RX ORDER — GINSENG 100 MG
CAPSULE ORAL
COMMUNITY

## 2024-06-14 NOTE — PROGRESS NOTES
"Subjective   Trish Corrales is a 54 y.o. female Cc: annual visit today , mammogram 5/29/24 benign recommend follow-up mammogram in 6 months she might not do the additional testing she believes its from breast reduction whatever findings they have she would like your opinion on it , she does not have a family hx of cancer ,  last pap 5/1/23 neg , contraception none , no PMB , colonoscopy 7/2020 due again 2025 . She is doing well .     Trish Corrales is a 53 y.o. female presents for  annual visit with mammogram @ office today , last pap 4/20/22 , no PMB - no periods since  8/2021,   h/o D&C 2019 , colonoscopy 7/2020 due again 2025 , bilateral lift on breasts and reduction on right breast 6 months ago - October 2022 dr ott - uses pallets (estrogen and testosterone ) physician [facility] [employer].  Just bought her own house. No longer in a relationship with the doctor.         History of Present Illness    The following portions of the patient's history were reviewed and updated as appropriate: allergies, current medications, past family history, past medical history, past social history, past surgical history, and problem list.    Review of Systems   Constitutional:  Negative for chills and fever.   Gastrointestinal:  Negative for abdominal distention and abdominal pain.   Genitourinary:  Negative for dysuria, menstrual problem, pelvic pain, vaginal bleeding, vaginal discharge and vaginal pain.   All other systems reviewed and are negative.  /68   Ht 154.9 cm (61\")   Wt 56.7 kg (125 lb)   LMP  (LMP Unknown)   BMI 23.62 kg/m²       Objective   Physical Exam  Vitals and nursing note reviewed.   Constitutional:       Appearance: Normal appearance. She is well-developed and normal weight.   Neck:      Thyroid: No thyromegaly.   Pulmonary:      Effort: Pulmonary effort is normal.   Chest:   Breasts:     Right: Mass present. No nipple discharge, skin change or tenderness.      Left: No mass, nipple discharge, skin " change or tenderness.          Comments: 1 cm stable necrotic nodule at 7:00 in the right breast  Abdominal:      General: There is no distension.      Palpations: Abdomen is soft.      Tenderness: There is no abdominal tenderness.   Genitourinary:     General: Normal vulva.      Exam position: Lithotomy position.      Labia:         Right: No rash or lesion.         Left: No rash or lesion.       Vagina: Normal. No vaginal discharge or bleeding.      Cervix: No friability, lesion or cervical bleeding.      Uterus: Not enlarged and not tender.       Adnexa:         Right: No mass or tenderness.          Left: No mass or tenderness.     Musculoskeletal:         General: Normal range of motion.      Cervical back: Normal range of motion.   Skin:     General: Skin is warm and dry.      Findings: No rash.   Neurological:      Mental Status: She is alert and oriented to person, place, and time.   Psychiatric:         Mood and Affect: Mood normal.         Behavior: Behavior normal.           Assessment & Plan   Diagnoses and all orders for this visit:    1. Encounter for gynecological examination without abnormal finding (Primary)    2. Screening for malignant neoplasm of the cervix  -     IgP, Aptima HPV    3. Breast cancer screening by mammogram  -     Mammo Screening Digital Tomosynthesis Bilateral With CAD; Future      Counseling was given to patient for the following topics: instructions for management, importance of treatment compliance, and breast exams  .  Return in about 1 year (around 6/14/2025) for Annual physical and MMG .

## 2024-06-18 LAB
CYTOLOGIST CVX/VAG CYTO: NORMAL
CYTOLOGY CVX/VAG DOC CYTO: NORMAL
CYTOLOGY CVX/VAG DOC THIN PREP: NORMAL
DX ICD CODE: NORMAL
HPV I/H RISK 4 DNA CVX QL PROBE+SIG AMP: NEGATIVE
Lab: NORMAL
Lab: NORMAL
OTHER STN SPEC: NORMAL
STAT OF ADQ CVX/VAG CYTO-IMP: NORMAL

## 2024-07-14 RX ORDER — NITROFURANTOIN 25; 75 MG/1; MG/1
100 CAPSULE ORAL 2 TIMES DAILY
Qty: 14 CAPSULE | Refills: 0 | Status: SHIPPED | OUTPATIENT
Start: 2024-07-14 | End: 2024-07-21

## 2024-12-11 ENCOUNTER — OFFICE VISIT (OUTPATIENT)
Dept: FAMILY MEDICINE CLINIC | Facility: CLINIC | Age: 54
End: 2024-12-11
Payer: COMMERCIAL

## 2024-12-11 VITALS
SYSTOLIC BLOOD PRESSURE: 112 MMHG | RESPIRATION RATE: 16 BRPM | OXYGEN SATURATION: 100 % | HEIGHT: 61 IN | WEIGHT: 126.3 LBS | DIASTOLIC BLOOD PRESSURE: 82 MMHG | BODY MASS INDEX: 23.85 KG/M2 | TEMPERATURE: 97.9 F | HEART RATE: 65 BPM

## 2024-12-11 DIAGNOSIS — J34.89 SINUS DRAINAGE: ICD-10-CM

## 2024-12-11 DIAGNOSIS — J01.00 ACUTE NON-RECURRENT MAXILLARY SINUSITIS: ICD-10-CM

## 2024-12-11 DIAGNOSIS — J02.9 SORE THROAT: ICD-10-CM

## 2024-12-11 DIAGNOSIS — H92.02 EARACHE ON LEFT: ICD-10-CM

## 2024-12-11 DIAGNOSIS — J06.9 UPPER RESPIRATORY TRACT INFECTION, UNSPECIFIED TYPE: Primary | ICD-10-CM

## 2024-12-11 DIAGNOSIS — R09.81 CONGESTED NOSE: ICD-10-CM

## 2024-12-11 DIAGNOSIS — R06.7 SNEEZING: ICD-10-CM

## 2024-12-11 LAB
EXPIRATION DATE: NORMAL
FLUAV AG NPH QL: NEGATIVE
FLUBV AG NPH QL: NEGATIVE
INTERNAL CONTROL: NORMAL
Lab: NORMAL
S PYO RRNA THROAT QL PROBE: NEGATIVE
SARS-COV-2 AG UPPER RESP QL IA.RAPID: NOT DETECTED

## 2024-12-11 RX ORDER — AZITHROMYCIN 250 MG/1
TABLET, FILM COATED ORAL
Qty: 6 TABLET | Refills: 0 | Status: SHIPPED | OUTPATIENT
Start: 2024-12-11

## 2024-12-11 NOTE — PROGRESS NOTES
"Chief Complaint  Sore Throat, Nasal Congestion, Sinus Problem, Earache, and SNEEZING    Subjective    History of Present Illness  The patient is a 54-year-old female who presents for evaluation of an acute issue.    She initially experienced a sore throat last Wednesday, which resolved by Monday. However, she subsequently developed symptoms of sneezing and sinus congestion. The nasal discharge is described as watery rather than phlegmy or mucous-like. These symptoms have been disrupting her sleep, leading to fatigue. She also reports a sensation of pressure in her left ear, which has since improved. She is seeking medical clearance to rule out an infection before her upcoming travel to [place]. She reports no chest tightness or cough but notes mild morning congestion and expectoration of a small amount of phlegm. She has been self-medicating with over-the-counter antihistamines, which have not alleviated her symptoms. She has been using diphenhydramine to aid sleep but continues to experience awakenings due to nasal congestion and dry mouth. She has attempted to use doxylamine without success.    Supplemental Information  She is on hormone replacement therapy and has undergone laboratory testing through [facility].    ALLERGIES  The patient is allergic to PENICILLIN and SULFA.    MEDICATIONS  Current: diphenhydramine, doxylamine       Trish Corrales presents to [facility]  Sore Throat   Associated symptoms include ear pain.   Sinus Problem  Associated symptoms include ear pain and a sore throat.   Earache   Associated symptoms include a sore throat.       Objective   Vital Signs:  /82 (BP Location: Left arm, Patient Position: Sitting, Cuff Size: Adult)   Pulse 65   Temp 97.9 °F (36.6 °C) (Oral)   Resp 16   Ht 154.9 cm (61\")   Wt 57.3 kg (126 lb 4.8 oz)   SpO2 100%   BMI 23.86 kg/m²   Estimated body mass index is 23.86 kg/m² as calculated from the following:    Height as of this " "encounter: 154.9 cm (61\").    Weight as of this encounter: 57.3 kg (126 lb 4.8 oz).    BMI is within normal parameters. No other follow-up for BMI required.      Physical Exam  HENT:      Head: Normocephalic and atraumatic.      Mouth/Throat:      Mouth: Mucous membranes are moist.      Pharynx: Oropharynx is clear.   Eyes:      Extraocular Movements: Extraocular movements intact.      Conjunctiva/sclera: Conjunctivae normal.      Pupils: Pupils are equal, round, and reactive to light.   Cardiovascular:      Rate and Rhythm: Normal rate and regular rhythm.   Pulmonary:      Effort: Pulmonary effort is normal.      Breath sounds: Normal breath sounds.   Abdominal:      General: Bowel sounds are normal.      Palpations: Abdomen is soft.   Musculoskeletal:         General: Normal range of motion.      Cervical back: Neck supple.   Skin:     General: Skin is warm.      Capillary Refill: Capillary refill takes less than 2 seconds.   Neurological:      General: No focal deficit present.      Mental Status: She is alert and oriented to person, place, and time. Mental status is at baseline.   Psychiatric:         Mood and Affect: Mood normal.        Result Review :  The following data was reviewed by: Yolande Boone MD on 12/11/2024:                    Assessment and Plan   Diagnoses and all orders for this visit:    1. Upper respiratory tract infection, unspecified type (Primary)    2. Sinus drainage  -     POC Influenza A / B  -     POCT SARS-CoV-2 Antigen  -     POCT Strep A, molecular    3. Sore throat  -     POC Influenza A / B  -     POCT SARS-CoV-2 Antigen  -     POCT Strep A, molecular    4. Congested nose  -     POC Influenza A / B  -     POCT SARS-CoV-2 Antigen  -     POCT Strep A, molecular    5. Earache on left  -     POC Influenza A / B  -     POCT SARS-CoV-2 Antigen  -     POCT Strep A, molecular    6. Sneezing  -     POC Influenza A / B  -     POCT SARS-CoV-2 Antigen  -     POCT Strep A, molecular    7. " Acute non-recurrent maxillary sinusitis  -     azithromycin (Zithromax Z-Pablo) 250 MG tablet; Take 2 tablets by mouth on day 1, then 1 tablet daily on days 2-5  Dispense: 6 tablet; Refill: 0        Assessment & Plan  1. Viral upper respiratory infection.  Her symptoms, including sore throat, sinus congestion, and runny nose, suggest a viral etiology rather than a bacterial one. The sore throat has subsided, but sinus congestion and runny nose persist. There is no evidence of bacterial infection in the ears or throat. Influenza, COVID-19, and strep tests were all negative. She is advised to discontinue the use of antihistamines unless symptoms become significantly bothersome. Ibuprofen is recommended to alleviate sinus pressure and associated symptoms. For sleep disturbances due to nasal congestion, she may take 2 tablets of Benadryl 25 mg. She is informed that these medications may induce fatigue and should be used sparingly. A prescription for Z-Pablo will be provided for use if symptoms persist beyond day 10.            Follow Up   No follow-ups on file.  Patient was given instructions and counseling regarding her condition or for health maintenance advice. Please see specific information pulled into the AVS if appropriate.     Patient or patient representative verbalized consent for the use of Ambient Listening during the visit with  Yolande Boone MD for chart documentation. 12/11/2024  10:32 EST

## 2024-12-27 ENCOUNTER — TELEPHONE (OUTPATIENT)
Dept: OBSTETRICS AND GYNECOLOGY | Age: 54
End: 2024-12-27
Payer: COMMERCIAL

## 2024-12-27 NOTE — TELEPHONE ENCOUNTER
12/27/2024 [facility] has left 3 VMs for pt to schedule her f/u breast imaging that was due in 11/2024. I also left VM for pt to call to get imaging scheduled.

## 2024-12-27 NOTE — TELEPHONE ENCOUNTER
"12/27/2024 Pt returned my call and stated she will not be having dx imaging done.\"It is her body and her choice to not have radiation that often\" I told her that it was her choice and that I was just following up with radiologist recommendations. I will my chart msg her [facility] number for her to call and talk with them regarding having screening mammogram. I am cancelling order for diagnostic breast imaging  "

## 2025-04-17 ENCOUNTER — TELEPHONE (OUTPATIENT)
Dept: FAMILY MEDICINE CLINIC | Facility: CLINIC | Age: 55
End: 2025-04-17

## 2025-04-17 NOTE — TELEPHONE ENCOUNTER
Caller: Tayo Corrales    Relationship: Self    Best call back number: [phone]     What is the best time to reach you: ANY    Who are you requesting to speak with (clinical staff, provider,  specific staff member): CLINICAL STAFF     Do you know the name of the person who called: TAYO    What was the call regarding: PATIENT IS REQUESTING TO SCHEDULE A LAB APPOINMENT PRIOR TO PHYSICAL , PLEASE CALL TO SCHEDULE.    Is it okay if the provider responds through [facility]: YES

## 2025-04-17 NOTE — TELEPHONE ENCOUNTER
Caller: Trish Corrales    Relationship to patient: Self    Best call back number:[phone]      Patient is needing: SCHEDULED PHYSICAL WITH [employer] DUE TO DATE.  SHE NEEDED IT BEFORE 4/30/25 AS SHE IS LEAVING HER JOB AND WON'T HAVE INSURANCE AFTER THAT DATE.

## 2025-04-18 ENCOUNTER — OFFICE VISIT (OUTPATIENT)
Dept: FAMILY MEDICINE CLINIC | Facility: CLINIC | Age: 55
End: 2025-04-18
Payer: COMMERCIAL

## 2025-04-18 VITALS
DIASTOLIC BLOOD PRESSURE: 68 MMHG | HEART RATE: 68 BPM | OXYGEN SATURATION: 99 % | SYSTOLIC BLOOD PRESSURE: 99 MMHG | HEIGHT: 61 IN | WEIGHT: 117 LBS | BODY MASS INDEX: 22.09 KG/M2

## 2025-04-18 DIAGNOSIS — E55.9 VITAMIN D DEFICIENCY: ICD-10-CM

## 2025-04-18 DIAGNOSIS — Z00.00 ANNUAL PHYSICAL EXAM: Primary | ICD-10-CM

## 2025-04-18 DIAGNOSIS — E78.2 MIXED HYPERLIPIDEMIA: ICD-10-CM

## 2025-04-18 DIAGNOSIS — R53.82 CHRONIC FATIGUE: ICD-10-CM

## 2025-04-18 DIAGNOSIS — D50.9 IRON DEFICIENCY ANEMIA, UNSPECIFIED IRON DEFICIENCY ANEMIA TYPE: ICD-10-CM

## 2025-04-18 NOTE — PROGRESS NOTES
"4Subjective   Trish Corrales is a 55 y.o. female. Patient is here today for   Chief Complaint   Patient presents with    Annual Exam          Vitals:    04/18/25 1525   BP: 99/68   Pulse: 68   SpO2: 99%   Weight: 53.1 kg (117 lb)   Height: 154.3 cm (60.75\")      Body mass index is 22.29 kg/m².    The following portions of the patient's history were reviewed and updated as appropriate: allergies, current medications, past family history, past medical history, past social history, past surgical history and problem list.    Past Medical History:   Diagnosis Date    ADHD (attention deficit hyperactivity disorder)     Allergic 1970    Penecillin, Sulfa    Depression     Fibroid 2000    Hyperlipidemia 2021 207    Urinary tract infection 1987, 2000    Varicella       Allergies   Allergen Reactions    Sulfa Antibiotics Rash    Penicillins Hives      Social History     Socioeconomic History    Marital status: Single   Tobacco Use    Smoking status: Never     Passive exposure: Never    Smokeless tobacco: Never    Tobacco comments:     Occasionally back in HS   Vaping Use    Vaping status: Never Used   Substance and Sexual Activity    Alcohol use: Not Currently    Drug use: Never    Sexual activity: Yes     Partners: Male     Birth control/protection: Post-menopausal, None        Current Outpatient Medications:     amphetamine-dextroamphetamine XR (ADDERALL XR) 25 MG 24 hr capsule, Take 20 mg by mouth Every Morning, Disp: , Rfl:     ascorbic acid (VITAMIN C) 1000 MG tablet, Take 1 tablet by mouth Daily., Disp: , Rfl:     Lactobacillus Rhamnosus, GG, (PROBIOTIC COLIC PO), Take  by mouth. Take 2 in the morning., Disp: , Rfl:     MAGNESIUM PO, Take  by mouth., Disp: , Rfl:     Misc Natural Products (Adv Turmeric Curcumin Complex) capsule, , Disp: , Rfl:     Progesterone (PROMETRIUM) 200 MG capsule, Take 1 capsule by mouth Daily., Disp: , Rfl:     Unable to find, INJECT 0.25ML (25 UNITS ON INSULIN SYRINGE) INTO SKIN ONCE WEEKLY, " Disp: , Rfl:     vitamin D3 125 MCG (5000 UT) capsule capsule, Take 1 capsule by mouth Daily., Disp: , Rfl:     Zinc 50 MG tablet, Take  by mouth., Disp: , Rfl:     azithromycin (Zithromax Z-Pablo) 250 MG tablet, Take 2 tablets by mouth on day 1, then 1 tablet daily on days 2-5 (Patient not taking: Reported on 4/18/2025), Disp: 6 tablet, Rfl: 0     Last Mammogram: 6/4/2024 mass to right breast, repeat in 6 months; Patient stating she did not do repeat/declined    GYN: Dr. Garcia  Last pap: 6/17/24  3 yrs post menopause    Last Colonoscopy: 7/2020 with results of internal hemrroids otherwise negative; repeat in 5 years      ECG Report in office today: SR, vent rate 65, MI int 142, QRS 92      Objective   History of Present Illness  Patient here today for annual physical.     Trish Corrales 55 y.o. female who presents for an Annual Wellness Visit.  she has a history of   Patient Active Problem List   Diagnosis    Acne vulgaris    Benign neoplasm of skin of trunk, except scrotum    Intramural leiomyoma of uterus    Lentigo    Other malaise and fatigue    Skin pigmentation disorder    Unspecified local infection of skin and subcutaneous tissue    High risk medication use    Attention deficit hyperactivity disorder (ADHD), predominantly inattentive type    Iron deficiency anemia    Vitamin D deficiency    Chronic fatigue    Immunization due    Abnormal mammogram of right breast   .  she has been doing well with new interval problems.  Labs results discussed in detail with the patient.  Plan to update vaccines if needed today.    Health Habits:  Dental Exam. up to date  Eye Exam. up to date  Exercise: 5 times/week.  Current exercise activities include: walking and weightlifting  Diet: eating healthy  Water: 64 oz of water a day      Preventative counseling  Discussed face to face the importance of healthy diet and exercise.       No results found for this or any previous visit (from the past 2 weeks).     Review of Systems    Constitutional: Negative.    Respiratory: Negative.     Cardiovascular: Negative.    Gastrointestinal: Negative.    Genitourinary: Negative.    Neurological: Negative.    Psychiatric/Behavioral: Negative.         Physical Exam  Constitutional:       Appearance: Normal appearance. She is well-developed.   HENT:      Head: Normocephalic and atraumatic.      Right Ear: Tympanic membrane normal. Tympanic membrane is not erythematous.      Left Ear: Tympanic membrane normal. Tympanic membrane is not erythematous.      Nose: Nose normal.   Eyes:      Conjunctiva/sclera: Conjunctivae normal.      Pupils: Pupils are equal, round, and reactive to light.   Neck:      Thyroid: No thyromegaly.      Vascular: No carotid bruit.      Trachea: No tracheal deviation.   Cardiovascular:      Rate and Rhythm: Normal rate and regular rhythm.      Pulses: Normal pulses.      Heart sounds: Normal heart sounds. No murmur heard.  Pulmonary:      Effort: No accessory muscle usage or respiratory distress.      Breath sounds: Normal breath sounds. No stridor. No decreased breath sounds, wheezing, rhonchi or rales.   Abdominal:      General: Bowel sounds are normal. There is no distension.      Palpations: Abdomen is soft. Abdomen is not rigid. There is no mass.      Tenderness: There is no abdominal tenderness. There is no guarding or rebound.      Hernia: No hernia is present.   Musculoskeletal:         General: Normal range of motion.      Cervical back: Normal range of motion and neck supple.   Lymphadenopathy:      Cervical: No cervical adenopathy.   Skin:     General: Skin is warm and dry.      Capillary Refill: Capillary refill takes less than 2 seconds.   Neurological:      Mental Status: She is alert and oriented to person, place, and time.      Cranial Nerves: No cranial nerve deficit.      Sensory: No sensory deficit.      Motor: No abnormal muscle tone.      Coordination: Coordination normal.   Psychiatric:         Speech: Speech  normal.         Behavior: Behavior normal.         ASSESSMENT       Problems Addressed this Visit          Endocrine and Metabolic    Vitamin D deficiency    Relevant Orders    Vitamin D,25-Hydroxy       Hematology and Neoplasia    Iron deficiency anemia    Relevant Orders    CBC & Differential    Iron Profile       Symptoms and Signs    Chronic fatigue    Relevant Orders    CBC & Differential    Vitamin D,25-Hydroxy    Iron Profile     Other Visit Diagnoses         Annual physical exam    -  Primary    Relevant Orders    CBC & Differential    Lipid Panel With LDL / HDL Ratio    Comprehensive Metabolic Panel    Urinalysis With Microscopic - Urine, Clean Catch    TSH    T4, Free      Mixed hyperlipidemia        Relevant Orders    ECG 12 Lead          Diagnoses         Codes Comments      Annual physical exam    -  Primary ICD-10-CM: Z00.00  ICD-9-CM: V70.0       Mixed hyperlipidemia     ICD-10-CM: E78.2  ICD-9-CM: 272.2       Vitamin D deficiency     ICD-10-CM: E55.9  ICD-9-CM: 268.9       Iron deficiency anemia, unspecified iron deficiency anemia type     ICD-10-CM: D50.9  ICD-9-CM: 280.9       Chronic fatigue     ICD-10-CM: R53.82  ICD-9-CM: 780.79               PLAN    Patient Instructions   Hyperlipidemia: EKG done in office with results of SR; labs ordered; Patient to continue to eat a low fat heart healthy diet, continue to drink plenty of water with goal 64 oz a day and continue to exercise 5 times a week    Vitamin D deficiency/iron def anemia/chronic fatigue: labs ordered    Follow up with dentist and optometrist when able  Always use seat belt when in vehicles    Return for Dr. Boone as needed/as recommended.

## 2025-04-18 NOTE — PATIENT INSTRUCTIONS
Hyperlipidemia: EKG done in office with results of SR; labs ordered; Patient to continue to eat a low fat heart healthy diet, continue to drink plenty of water with goal 64 oz a day and continue to exercise 5 times a week    Vitamin D deficiency/iron def anemia/chronic fatigue: labs ordered    Follow up with dentist and optometrist when able  Always use seat belt when in vehicles

## 2025-04-25 ENCOUNTER — TELEMEDICINE (OUTPATIENT)
Dept: FAMILY MEDICINE CLINIC | Facility: TELEHEALTH | Age: 55
End: 2025-04-25
Payer: COMMERCIAL
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
## 2025-04-25 DIAGNOSIS — S30.860A TICK BITE OF BACK, INITIAL ENCOUNTER: Primary | ICD-10-CM

## 2025-04-25 RX ORDER — DOXYCYCLINE 100 MG/1
200 CAPSULE ORAL ONCE
Qty: 2 CAPSULE | Refills: 0 | Status: SHIPPED | OUTPATIENT
Start: 2025-04-25 | End: 2025-04-25

## 2025-04-25 NOTE — PROGRESS NOTES
No chief complaint on file.      Video Visit Reason:   Free Text Description: Tic bite that is itchy and appears red and swollen. Concerned it may be Lyme  Subjective   Trish Corrales is a 55 y.o. female.     History of Present Illness  The patient presents via virtual visit for evaluation of a tick bite.    An itchy, slightly erythematous tick bite is reported. The duration of the tick's attachment prior to its removal is uncertain, but it is estimated to be less than 24 hours. A photograph of the tick has been documented. Upon removal, the entire tick was extracted without any residual parts left in the skin. Mild fatigue is reported, attributed to inadequate sleep the previous night. Interest in prophylactic doxycycline treatment is expressed, along with inquiries about potential side effects and the efficacy of a single dose in preventing Lyme disease.        The following portions of the patient's history were reviewed and updated as appropriate: allergies, current medications, past medical history, and problem list.      Past Medical History:   Diagnosis Date    ADHD (attention deficit hyperactivity disorder)     Allergic 1970    Penecillin, Sulfa    Depression     Fibroid 2000    Hyperlipidemia 2021 207    Urinary tract infection 1987, 2000    Varicella      Social History     Socioeconomic History    Marital status: Single   Tobacco Use    Smoking status: Never     Passive exposure: Never    Smokeless tobacco: Never    Tobacco comments:     Occasionally back in HS   Vaping Use    Vaping status: Never Used   Substance and Sexual Activity    Alcohol use: Not Currently    Drug use: Never    Sexual activity: Yes     Partners: Male     Birth control/protection: Post-menopausal, None     medication documentation: reviewed and updated with patient and   Current Outpatient Medications:     amphetamine-dextroamphetamine XR (ADDERALL XR) 25 MG 24 hr capsule, Take 20 mg by mouth Every Morning, Disp: , Rfl:     ascorbic  acid (VITAMIN C) 1000 MG tablet, Take 1 tablet by mouth Daily., Disp: , Rfl:     doxycycline (VIBRAMYCIN) 100 MG capsule, Take 2 capsules by mouth 1 (One) Time for 1 dose., Disp: 2 capsule, Rfl: 0    Lactobacillus Rhamnosus, GG, (PROBIOTIC COLIC PO), Take  by mouth. Take 2 in the morning., Disp: , Rfl:     MAGNESIUM PO, Take  by mouth., Disp: , Rfl:     Misc Natural Products (Adv Turmeric Curcumin Complex) capsule, , Disp: , Rfl:     Progesterone (PROMETRIUM) 200 MG capsule, Take 1 capsule by mouth Daily., Disp: , Rfl:     Unable to find, INJECT 0.25ML (25 UNITS ON INSULIN SYRINGE) INTO SKIN ONCE WEEKLY, Disp: , Rfl:     vitamin D3 125 MCG (5000 UT) capsule capsule, Take 1 capsule by mouth Daily., Disp: , Rfl:     Zinc 50 MG tablet, Take  by mouth., Disp: , Rfl:   Review of Systems  See HPI  Objective   Physical Exam  Constitutional:       General: She is not in acute distress.  Skin:            Comments: Tick bite, see attached photos   Neurological:      Mental Status: She is alert.         Assessment & Plan   Diagnoses and all orders for this visit:    1. Tick bite of back, initial encounter (Primary)  -     doxycycline (VIBRAMYCIN) 100 MG capsule; Take 2 capsules by mouth 1 (One) Time for 1 dose.  Dispense: 2 capsule; Refill: 0     - Symptoms do not meet the criteria for antibiotic prophylaxis, as the tick was not attached for more than 36 hours, but was removed less than 72 hours ago and there are no significant symptoms such as headache, body aches, fever, or extreme fatigue.  - Redness and itchiness are likely due to an immune response.  - A prescription for a single dose of doxycycline 200 mg, consisting of 2 capsules to be taken concurrently, has been provided to alleviate anxiety about potential Lyme disease. The prescription will be [facility] to [facility] in [place] on [address].  - Advised to take the medication with food to prevent potential gastrointestinal discomfort. For symptomatic relief of itching,  topical hydrocortisone cream and an antihistamine have been recommended. If symptoms persist, consult the primary care physician.             Follow Up:  If your symptoms are not resolving by the completion of your treatment or are worsening, see your primary care provider for follow up. If you don't have a primary care provider, you may go to any Urgent Care for re-evaluation. If you develop any life threatening symptoms, go to the nearest Emergency Department immediately or call EMS.       Patient or patient representative verbalized consent for the use of Ambient Listening during the visit with  ARIANNE Martin for chart documentation. 4/25/2025  10:16 EDT        The use of  Video Visit was utilized during this visit, using both [employer] and [employer]. The use of a video visit has been reviewed with the patient and verbal informed consent has been obtained. No technical difficulties occurred during the visit.    is located at [address] [place] [id number]  Provider is located at [place], [place]

## 2025-04-25 NOTE — PATIENT INSTRUCTIONS
Tick Bite Information, Adult    Ticks are insects that can bite. Most ticks live in bushes and grassy areas. They climb onto people and animals that go by. Then they bite. Some ticks carry germs that can make you sick.  How can I prevent tick bites?  Take these steps:  Before you go outside:  Wear long sleeves and long pants.  Wear light-colored clothes.  Tuck your pant legs into your socks.  Use an insect repellent that has 20% or higher of the ingredients DEET, picaridin, or [id number]. Follow the instructions on the label. Put it on:  Bare skin. Avoid your eyes and mouth areas.  The tops of your boots.  Your pant legs.  The ends of your sleeves.  If you use an insect repellent that has the ingredient permethrin, follow the instructions on the label. Do not put permethrin on the skin. Put it on:  Clothing.  Shoes.  Outdoor gear.  Tents.  When you are outside  Avoid walking through long grass.  Stay in the middle of the trail. Do not touch the bushes.  Check for ticks on your clothes, hair, and skin often while you are outside. Check again before you go inside.  When you go indoors  Check your clothes for ticks. Dry your clothes in a dryer on high heat for 10 minutes or more. If clothes are damp, more time may be needed.  Wash your clothes right away if they need to be washed. Use hot water.  Check your pets and outdoor gear.  Shower right away.  Check your body for ticks. Do a full body check using a mirror. Check your clothes, skin, head, neck, armpits, waist, groin, and joint areas.  What is the right way to remove a tick?  Remove the tick from your skin as soon as possible. Do not remove the tick with your bare fingers. Do not try to remove a tick with heat, alcohol, petroleum jelly, or fingernail polish.  To remove a tick that is crawling on your skin:  Go outside and brush the tick off.  Use tape or a lint roller.  To remove a tick that is biting:  Wash your hands.  If you have gloves, put them on.  Use tweezers,  curved forceps, or a tick-removal tool to grasp the tick. Grasp the tick as close to your skin and as close to the tick's head as possible.  Gently pull up until the tick lets go.  Try to keep the tick's head attached to its body.  Do not twist or jerk the tick.  Do not squeeze or crush the tick.  What should I do after taking out a tick?  Clean the bite area and your hands with soap and water, rubbing alcohol, or an iodine wash.  If you have an antiseptic cream or ointment, put a small amount on the bite area.  Wash and disinfect any instruments that you used to remove the tick.  How should I get rid of a live tick?  To get rid of a live tick, use one of these methods:  Place the tick in rubbing alcohol.  Place it in a bag or container you can close tightly. Throw it away.  Wrap it tightly in tape. Throw it away.  Flush it down the toilet.  Where to find more information  Centers for Disease Control and Prevention: cdc.gov/ticks  [place] Environmental Protection Agency: epa.gov/insect-repellents  Contact a doctor if:  You have a fever or chills.  You have a red rash that makes a [place] (bull's-eye rash) in the bite area.  You have redness and swelling where the tick bit you.  You have a headache or stiff neck.  You have pain in a muscle, joint, or bone.  You are more tired than normal.  You have trouble walking or moving your legs.  You have numbness in your legs.  You have tender or swollen lymph glands.  You have belly (abdominal) pain, vomiting, watery poop (diarrhea), or weight loss.  Get help right away if:  You cannot remove a tick.  You cannot move (have paralysis) or feel weak.  You are feeling worse or have new symptoms.  You find a tick that is biting you and filled with blood, especially if you are in an area where diseases from ticks are common.  Summary  Ticks may carry germs that can make you sick.  To prevent tick bites wear long sleeves, long pants, and light colors. Use insect repellent. Follow the  instructions on the label.  If the tick is biting, remove it right away. Do not try to remove it with heat, alcohol, petroleum jelly, or fingernail polish.  Contact a doctor if you have symptoms of a disease after being bitten by a tick.  This information is not intended to replace advice given to you by your health care provider. Make sure you discuss any questions you have with your health care provider.  Document Revised: 03/20/2023 Document Reviewed: 03/20/2023  [employer] Patient Education © 2024 [employer]

## 2025-07-14 DIAGNOSIS — Z12.31 SCREENING MAMMOGRAM FOR BREAST CANCER: Primary | ICD-10-CM
